# Patient Record
Sex: FEMALE | Race: WHITE | NOT HISPANIC OR LATINO | Employment: OTHER | URBAN - METROPOLITAN AREA
[De-identification: names, ages, dates, MRNs, and addresses within clinical notes are randomized per-mention and may not be internally consistent; named-entity substitution may affect disease eponyms.]

---

## 2017-01-23 ENCOUNTER — TRANSCRIBE ORDERS (OUTPATIENT)
Dept: MAMMOGRAPHY | Facility: HOSPITAL | Age: 57
End: 2017-01-23

## 2017-01-23 DIAGNOSIS — Z12.31 VISIT FOR SCREENING MAMMOGRAM: Primary | ICD-10-CM

## 2017-03-16 ENCOUNTER — HOSPITAL ENCOUNTER (OUTPATIENT)
Dept: MAMMOGRAPHY | Facility: HOSPITAL | Age: 57
Discharge: HOME | End: 2017-03-16
Attending: OBSTETRICS & GYNECOLOGY | Admitting: OBSTETRICS & GYNECOLOGY

## 2017-03-16 DIAGNOSIS — Z12.31 VISIT FOR SCREENING MAMMOGRAM: ICD-10-CM

## 2017-03-16 PROCEDURE — 77063 BREAST TOMOSYNTHESIS BI: CPT

## 2017-03-16 PROCEDURE — 77067 SCR MAMMO BI INCL CAD: CPT | Performed by: RADIOLOGY

## 2017-03-16 PROCEDURE — 77063 BREAST TOMOSYNTHESIS BI: CPT | Performed by: RADIOLOGY

## 2017-03-16 PROCEDURE — G0202 SCR MAMMO BI INCL CAD: HCPCS

## 2018-09-19 ENCOUNTER — TRANSCRIBE ORDERS (OUTPATIENT)
Dept: MAMMOGRAPHY | Facility: HOSPITAL | Age: 58
End: 2018-09-19

## 2018-09-19 DIAGNOSIS — Z12.31 VISIT FOR SCREENING MAMMOGRAM: Primary | ICD-10-CM

## 2018-10-08 ENCOUNTER — HOSPITAL ENCOUNTER (OUTPATIENT)
Dept: MAMMOGRAPHY | Facility: HOSPITAL | Age: 58
Discharge: HOME | End: 2018-10-08
Attending: OBSTETRICS & GYNECOLOGY | Admitting: OBSTETRICS & GYNECOLOGY

## 2018-10-08 ENCOUNTER — APPOINTMENT (OUTPATIENT)
Dept: MAMMOGRAPHY | Facility: HOSPITAL | Age: 58
End: 2018-10-08
Attending: OBSTETRICS & GYNECOLOGY

## 2018-10-08 DIAGNOSIS — Z12.31 VISIT FOR SCREENING MAMMOGRAM: ICD-10-CM

## 2018-10-08 PROCEDURE — 77067 SCR MAMMO BI INCL CAD: CPT | Performed by: RADIOLOGY

## 2018-10-08 PROCEDURE — 77063 BREAST TOMOSYNTHESIS BI: CPT | Performed by: RADIOLOGY

## 2018-10-08 PROCEDURE — 77067 SCR MAMMO BI INCL CAD: CPT

## 2018-10-08 PROCEDURE — 77063 BREAST TOMOSYNTHESIS BI: CPT

## 2018-12-13 ENCOUNTER — TRANSCRIBE ORDERS (OUTPATIENT)
Dept: ADMINISTRATIVE | Facility: HOSPITAL | Age: 58
End: 2018-12-13

## 2018-12-13 DIAGNOSIS — Z78.0 POSTMENOPAUSE: Primary | ICD-10-CM

## 2019-01-23 ENCOUNTER — APPOINTMENT (OUTPATIENT)
Dept: BONE DENSITY | Facility: HOSPITAL | Age: 59
End: 2019-01-23
Attending: INTERNAL MEDICINE

## 2019-02-21 RX ORDER — ZOLPIDEM TARTRATE 5 MG/1
TABLET ORAL
Qty: 30 TABLET | Refills: 1 | Status: SHIPPED | OUTPATIENT
Start: 2019-02-21 | End: 2019-10-07 | Stop reason: SDUPTHER

## 2019-04-04 RX ORDER — DEXLANSOPRAZOLE 60 MG/1
CAPSULE, DELAYED RELEASE ORAL
Qty: 30 CAPSULE | Refills: 5 | Status: SHIPPED | OUTPATIENT
Start: 2019-04-04 | End: 2019-04-05 | Stop reason: SDUPTHER

## 2019-04-05 RX ORDER — DEXLANSOPRAZOLE 60 MG/1
CAPSULE, DELAYED RELEASE ORAL
Qty: 30 CAPSULE | Refills: 5 | Status: SHIPPED | OUTPATIENT
Start: 2019-04-05 | End: 2019-07-15

## 2019-05-01 ENCOUNTER — TELEPHONE (OUTPATIENT)
Dept: INTERNAL MEDICINE | Facility: CLINIC | Age: 59
End: 2019-05-01

## 2019-05-01 RX ORDER — LISINOPRIL 10 MG/1
TABLET ORAL
Qty: 30 TABLET | Refills: 5 | Status: SHIPPED | OUTPATIENT
Start: 2019-05-01 | End: 2019-12-17 | Stop reason: SDUPTHER

## 2019-05-01 RX ORDER — ARIPIPRAZOLE 2 MG/1
2 TABLET ORAL DAILY
Qty: 30 TABLET | Refills: 2 | Status: SHIPPED | OUTPATIENT
Start: 2019-05-01 | End: 2019-07-15

## 2019-05-01 NOTE — TELEPHONE ENCOUNTER
Reason for Call:   PT THINKS SOMETHING NEEDS TO BE ADDED TO HER DEPRESSION MEDICATION SHE STATES HER MOTHER PASSED AWAY ON 3/8/19     Symptoms:   CAN'T HARDLY GET OUT OF BED, HARD BEING WITH OTHER PEOPLE, SHE GUESSES ITS THE PTSD    Other pertinent info:  PT STATES YOU HAVE GIVEN HER SOMETHING BEFORE TO GET THROUGH THINGS LIKE THIS     PT WANTS YOU TO KNOW SHE HAS STARTED COUNSELING

## 2019-05-29 RX ORDER — BUPROPION HYDROCHLORIDE 300 MG/1
TABLET ORAL
Qty: 30 TABLET | Refills: 1 | Status: SHIPPED | OUTPATIENT
Start: 2019-05-29 | End: 2019-08-09 | Stop reason: SDUPTHER

## 2019-05-29 RX ORDER — DULOXETIN HYDROCHLORIDE 60 MG/1
CAPSULE, DELAYED RELEASE ORAL
Qty: 30 CAPSULE | Refills: 1 | Status: SHIPPED | OUTPATIENT
Start: 2019-05-29 | End: 2019-08-09 | Stop reason: SDUPTHER

## 2019-06-12 PROBLEM — E55.9 VITAMIN D DEFICIENCY: Status: ACTIVE | Noted: 2019-06-12

## 2019-06-12 PROBLEM — M47.816 LUMBAR SPONDYLOSIS: Status: ACTIVE | Noted: 2019-06-12

## 2019-06-12 PROBLEM — M79.7 FIBROMYALGIA: Status: ACTIVE | Noted: 2019-06-12

## 2019-06-12 PROBLEM — F32.A DEPRESSION: Status: ACTIVE | Noted: 2019-06-12

## 2019-06-12 PROBLEM — J30.9 ALLERGIC RHINITIS: Status: ACTIVE | Noted: 2019-06-12

## 2019-06-12 PROBLEM — K21.9 GASTROESOPHAGEAL REFLUX DISEASE WITHOUT ESOPHAGITIS: Status: ACTIVE | Noted: 2019-06-12

## 2019-06-12 PROBLEM — E78.5 HYPERLIPIDEMIA: Status: ACTIVE | Noted: 2019-06-12

## 2019-06-12 PROBLEM — I10 BENIGN ESSENTIAL HYPERTENSION: Status: ACTIVE | Noted: 2019-06-12

## 2019-06-12 PROBLEM — G43.909 MIGRAINE: Status: ACTIVE | Noted: 2019-06-12

## 2019-07-09 RX ORDER — TRIAMTERENE AND HYDROCHLOROTHIAZIDE 37.5; 25 MG/1; MG/1
CAPSULE ORAL
Qty: 30 CAPSULE | Refills: 0 | Status: SHIPPED | OUTPATIENT
Start: 2019-07-09 | End: 2019-08-09 | Stop reason: SDUPTHER

## 2019-07-15 ENCOUNTER — OFFICE VISIT (OUTPATIENT)
Dept: INTERNAL MEDICINE | Facility: CLINIC | Age: 59
End: 2019-07-15

## 2019-07-15 VITALS
DIASTOLIC BLOOD PRESSURE: 82 MMHG | WEIGHT: 164 LBS | SYSTOLIC BLOOD PRESSURE: 116 MMHG | BODY MASS INDEX: 27.32 KG/M2 | HEIGHT: 65 IN | HEART RATE: 88 BPM

## 2019-07-15 DIAGNOSIS — E55.9 VITAMIN D DEFICIENCY: ICD-10-CM

## 2019-07-15 DIAGNOSIS — M47.816 LUMBAR SPONDYLOSIS: ICD-10-CM

## 2019-07-15 DIAGNOSIS — G43.009 MIGRAINE WITHOUT AURA AND WITHOUT STATUS MIGRAINOSUS, NOT INTRACTABLE: ICD-10-CM

## 2019-07-15 DIAGNOSIS — M79.7 FIBROMYALGIA: ICD-10-CM

## 2019-07-15 DIAGNOSIS — E78.2 MIXED HYPERLIPIDEMIA: ICD-10-CM

## 2019-07-15 DIAGNOSIS — I10 BENIGN ESSENTIAL HYPERTENSION: Primary | ICD-10-CM

## 2019-07-15 DIAGNOSIS — F33.1 MODERATE EPISODE OF RECURRENT MAJOR DEPRESSIVE DISORDER (HCC): ICD-10-CM

## 2019-07-15 PROCEDURE — 99396 PREV VISIT EST AGE 40-64: CPT | Performed by: INTERNAL MEDICINE

## 2019-07-15 RX ORDER — LEVOTHYROXINE AND LIOTHYRONINE 38; 9 UG/1; UG/1
1 TABLET ORAL DAILY
COMMUNITY
End: 2020-07-31 | Stop reason: SDUPTHER

## 2019-07-15 RX ORDER — ESTRADIOL 10 UG/1
1 INSERT VAGINAL 2 TIMES WEEKLY
COMMUNITY
End: 2020-01-17

## 2019-07-15 RX ORDER — ALMOTRIPTAN 6.25 MG/1
1 TABLET, FILM COATED ORAL AS NEEDED
COMMUNITY
End: 2019-10-07 | Stop reason: SDUPTHER

## 2019-07-15 RX ORDER — NYSTATIN 100000 U/G
1 CREAM TOPICAL DAILY
COMMUNITY
End: 2021-09-16

## 2019-07-15 RX ORDER — ESTRADIOL 0.05 MG/D
1 PATCH, EXTENDED RELEASE TRANSDERMAL 2 TIMES WEEKLY
COMMUNITY
End: 2021-05-24 | Stop reason: SDUPTHER

## 2019-07-15 RX ORDER — BUSPIRONE HYDROCHLORIDE 7.5 MG/1
TABLET ORAL
Start: 2019-07-15 | End: 2019-12-17 | Stop reason: SDUPTHER

## 2019-07-15 RX ORDER — ERGOCALCIFEROL 1.25 MG/1
1 CAPSULE ORAL WEEKLY
COMMUNITY
End: 2019-11-18 | Stop reason: SDUPTHER

## 2019-07-15 RX ORDER — TRIAMCINOLONE ACETONIDE 1 MG/G
1 CREAM TOPICAL DAILY
COMMUNITY
End: 2020-07-31

## 2019-07-15 RX ORDER — LANSOPRAZOLE 30 MG/1
30 CAPSULE, DELAYED RELEASE ORAL DAILY
Qty: 30 CAPSULE | Refills: 5 | Status: SHIPPED | OUTPATIENT
Start: 2019-07-15 | End: 2020-01-17

## 2019-07-15 RX ORDER — MELOXICAM 15 MG/1
1 TABLET ORAL DAILY
COMMUNITY
End: 2019-10-07 | Stop reason: SDUPTHER

## 2019-07-15 NOTE — PROGRESS NOTES
[facility]     Batool Hampton  1960   [id number]      Patient Care Team:  Don Rodriguez MD as PCP - General (Internal Medicine)    Chief Complaint::   Chief Complaint   Patient presents with   • Annual Exam        HPI  Batool is now 59.  She comes in for annual examination and for follow-up of her anxiety and depression, fibromyalgia, hypertension, hyperlipidemia, vitamin D deficiency, migraine and lumbar spondylosis.  She remains under a great deal of stress.  She for the past few years her life was consumed by care of her mother.  Since her mother , her 's 17-year-old son has moved in with him, and she is now basically assumed care of him.  She is very frustrated that she now has another role as caregiver, and her  does not seem to recognize that fact.  She has been in [place] for the past few days, after she felt that she had to leave her current environment.    Chronic Conditions:      Patient Active Problem List   Diagnosis   • Lumbar spondylosis   • Fibromyalgia   • Depression   • Allergic rhinitis   • Hyperlipidemia   • Benign essential hypertension   • Migraine   • Gastroesophageal reflux disease without esophagitis   • Vitamin D deficiency        Past Medical History:   Diagnosis Date   • Acute cholecystitis 2016   • Deviated nasal septum    • HNP (herniated nucleus pulposus), lumbar 2008    L5Si   • MRSA (methicillin resistant staph aureus) culture positive     Great toe L       Past Surgical History:   Procedure Laterality Date   • HYSTERECTOMY     • KNEE SURGERY Bilateral    • LAPAROSCOPIC CHOLECYSTECTOMY     • NASAL SEPTUM SURGERY     • OOPHORECTOMY         Family History   Problem Relation Age of Onset   • BRCA 1/2 Maternal Grandmother 40   • BRCA 1/2 Cousin 23   • Cancer Father         Bladder   • Coronary artery disease Father 61        premature    • Pancreatic cancer Father    • Hypertension Father        Social History      Socioeconomic History   • Marital status:      Spouse name: Not on file   • Number of children: Not on file   • Years of education: Not on file   • Highest education level: Not on file   Tobacco Use   • Smoking status: Never Smoker       Allergies   Allergen Reactions   • Codeine Unknown (See Comments)     Unknown   • Morphine Unknown (See Comments)     Unknown   • Tetracyclines & Related Unknown (See Comments)     Unknown         Current Outpatient Medications:   •  almotriptan (AXERT) 6.25 MG tablet, Take 1 tablet by mouth As Needed. Migraine headache, Disp: , Rfl:   •  buPROPion XL (WELLBUTRIN XL) 300 MG 24 hr tablet, TAKE ONE TABLET ONCE DAILY, Disp: 30 tablet, Rfl: 1  •  diclofenac (VOLTAREN) 1 % gel gel, Apply 1 g topically to the appropriate area as directed Daily., Disp: , Rfl:   •  DULoxetine (CYMBALTA) 60 MG capsule, TAKE ONE CAPSULE ONCE DAILY, Disp: 30 capsule, Rfl: 1  •  estradiol (VIVELLE-DOT) 0.05 MG/24HR patch, Apply 1 patch topically to the appropriate area as directed 2 (Two) Times a Week., Disp: , Rfl:   •  estradiol (YUVAFEM) 10 MCG tablet vaginal tablet, Take 1 tablet by mouth 2 (Two) Times a Week., Disp: , Rfl:   •  lisinopril (PRINIVIL,ZESTRIL) 10 MG tablet, TAKE ONE TABLET ONCE DAILY, Disp: 30 tablet, Rfl: 5  •  Magnesium Citrate (CITROMA PO), Take 1 tablet by mouth Daily., Disp: , Rfl:   •  meloxicam (MOBIC) 15 MG tablet, Take 1 tablet by mouth Daily., Disp: , Rfl:   •  Misc Natural Products (PROGESTERONE EX), Apply 80 mg topically 2 (Two) Times a Day., Disp: , Rfl:   •  nystatin (MYCOSTATIN) 935725 UNIT/GM cream, Apply 1 application topically to the appropriate area as directed Daily., Disp: , Rfl:   •  Thyroid (ARMOUR THYROID) 60 MG PO tablet, Take 1 tablet by mouth Daily., Disp: , Rfl:   •  triamcinolone (KENALOG) 0.1 % cream, Apply 1 application topically to the appropriate area as directed Daily., Disp: , Rfl:   •  triamterene-hydrochlorothiazide (DYAZIDE) 37.5-25 MG per  "capsule, TAKE ONE CAPSULE ONCE DAILY, Disp: 30 capsule, Rfl: 0  •  vitamin D (ERGOCALCIFEROL) 17102 units capsule capsule, Take 1 capsule by mouth 1 (One) Time Per Week., Disp: , Rfl:   •  zolpidem (AMBIEN) 5 MG tablet, TAKE ONE TABLET AT BEDTIME, Disp: 30 tablet, Rfl: 1  •  busPIRone (BUSPAR) 7.5 MG tablet, Take two tablets twice a day, Disp: , Rfl:   •  lansoprazole (PREVACID) 30 MG capsule, Take 1 capsule by mouth Daily., Disp: 30 capsule, Rfl: 5    Immunization History   Administered Date(s) Administered   • Flu Vaccine Quad PF >18YRS 12/07/2017, 12/04/2018   • Tdap 11/25/2014   • Zostavax 12/05/2018        Health Maintenance Due   Topic Date Due   • ANNUAL PHYSICAL  05/21/1963   • ZOSTER VACCINE (2 of 3) 01/30/2019   • HEPATITIS C SCREENING  02/21/2019        Review of Systems   Constitutional: Negative for chills, fatigue and fever.   HENT: Positive for ear pain. Negative for congestion and sinus pressure.    Respiratory: Negative for cough, chest tightness, shortness of breath and wheezing.    Cardiovascular: Negative for chest pain and palpitations.   Gastrointestinal: Negative for abdominal pain, blood in stool and constipation.   Skin: Negative for color change.   Allergic/Immunologic: Positive for environmental allergies.   Neurological: Negative for dizziness, speech difficulty and headache.   Psychiatric/Behavioral: Negative for decreased concentration. The patient is not nervous/anxious.         Vital Signs  Vitals:    07/15/19 1551   BP: 116/82   BP Location: Left arm   Patient Position: Sitting   Cuff Size: Adult   Pulse: 88   Weight: 74.4 kg (164 lb)   Height: 163.8 cm (64.5\")       Physical Exam   Constitutional: She is oriented to person, place, and time. She appears well-developed and well-nourished.   HENT:   Head: Normocephalic and atraumatic.   Cardiovascular: Normal rate, regular rhythm and normal heart sounds.   No murmur heard.  Pulmonary/Chest: Effort normal and breath sounds normal. "   Neurological: She is alert and oriented to person, place, and time.   Psychiatric: She has a normal mood and affect.   Vitals reviewed.     Procedures    ACE III MINI             Assessment/Plan:    Batool was seen today for annual exam.    Diagnoses and all orders for this visit:    Benign essential hypertension    Mixed hyperlipidemia    Migraine without aura and without status migrainosus, not intractable    Vitamin D deficiency    Fibromyalgia    Lumbar spondylosis    Moderate episode of recurrent major depressive disorder (CMS/HCC)    Other orders  -     lansoprazole (PREVACID) 30 MG capsule; Take 1 capsule by mouth Daily.  -     busPIRone (BUSPAR) 7.5 MG tablet; Take two tablets twice a day    Plan    Blood pressure is controlled on Dyazide and lisinopril.    Lipid panel is pending, continue healthy diet    Vitamin D is pending, continue current high-dose vitamin D.    Fibromyalgia is flared due to stress, depression. see below    We discussed treatment of depression, anxiety and management of stress at length.  She will continue current dosage of duloxetine, but will increase buspirone to 15 mg twice a day on a regular basis.  At that point if she is not better will increase to 30 mg twice a day.  She is seeing a therapist, who is been very helpful.  I have suggested that she resume regular physical activity almost every day, and we discussed the importance of meditation using apps on her phone.            Labs  Results for orders placed or performed in visit on 11/07/16   Tissue Exam   Result Value Ref Range    Case Report       Surgical Pathology Report                         Case: [id number]                                  Authorizing Provider:  Les BASSETT MD          Collected:           11/07/2016 11:25 AM          Pathologist:           Elvia Neri MD       Received:            11/07/2016 02:36 PM          Specimen:    Gallbladder                                                                                 Clinical Information       The working history is cholecystitis.       Final Diagnosis       GALLBLADDER:  Chronic cholecystitis with cholelithiasis.  MHB/klb       Gross Description       Received in formalin labeled gallbladder is an 8.0x3.5x3.0 cm intact gallbladder with attached cystic duct. The serosa is gray/purple and smooth with prominent vasculature. The lumen contains an abundant amount of yellow/brown viscid bile and a single yellow cholelith measuring 0.4 cm in greatest dimension. The mucosa is tan/brown and velvety and the wall thickness averages 0.3 cm. No masses are appreciated. Representative sections to include the cystic duct margin, neck, body and fundus are submitted in one cassette.  [facility]       Microscopic Description       Sections show muscular hypertrophy with slight chronic inflammation and Aschoff-Rokitansky sinus formation.  No neoplasm is seen.        Embedded Images          Counseling was given to patient for the following topics: appropriate exercise daily walking, disease prevention and healthy eating habits.    Plan of care reviewed with patient at the conclusion of today's visit. Education was provided regarding diagnosis, management, and any prescribed or recommended OTC medications.Patient verbalizes understanding of and agreement with management plan.         Don Rodriguez MD

## 2019-07-16 ENCOUNTER — LAB (OUTPATIENT)
Dept: LAB | Facility: HOSPITAL | Age: 59
End: 2019-07-16

## 2019-07-16 ENCOUNTER — TELEPHONE (OUTPATIENT)
Dept: INTERNAL MEDICINE | Facility: CLINIC | Age: 59
End: 2019-07-16

## 2019-07-16 DIAGNOSIS — E78.2 MIXED HYPERLIPIDEMIA: ICD-10-CM

## 2019-07-16 DIAGNOSIS — E55.9 VITAMIN D DEFICIENCY: ICD-10-CM

## 2019-07-16 DIAGNOSIS — I10 BENIGN ESSENTIAL HYPERTENSION: ICD-10-CM

## 2019-07-16 LAB
25(OH)D3 SERPL-MCNC: 43.3 NG/ML (ref 30–100)
ALBUMIN SERPL-MCNC: 4.3 G/DL (ref 3.5–5.2)
ALBUMIN UR-MCNC: <1.2 MG/L
ALBUMIN/GLOB SERPL: 1.5 G/DL
ALP SERPL-CCNC: 97 U/L (ref 39–117)
ALT SERPL W P-5'-P-CCNC: 33 U/L (ref 1–33)
ANION GAP SERPL CALCULATED.3IONS-SCNC: 12.5 MMOL/L (ref 5–15)
AST SERPL-CCNC: 30 U/L (ref 1–32)
BASOPHILS # BLD AUTO: 0.1 10*3/MM3 (ref 0–0.2)
BASOPHILS NFR BLD AUTO: 1.5 % (ref 0–1.5)
BILIRUB SERPL-MCNC: 0.4 MG/DL (ref 0.2–1.2)
BUN BLD-MCNC: 22 MG/DL (ref 6–20)
BUN/CREAT SERPL: 20.4 (ref 7–25)
CALCIUM SPEC-SCNC: 9.8 MG/DL (ref 8.6–10.5)
CHLORIDE SERPL-SCNC: 104 MMOL/L (ref 98–107)
CHOLEST SERPL-MCNC: 247 MG/DL (ref 0–200)
CO2 SERPL-SCNC: 25.5 MMOL/L (ref 22–29)
CREAT BLD-MCNC: 1.08 MG/DL (ref 0.57–1)
CREAT UR-MCNC: 158.4 MG/DL
DEPRECATED RDW RBC AUTO: 45.8 FL (ref 37–54)
EOSINOPHIL # BLD AUTO: 0.12 10*3/MM3 (ref 0–0.4)
EOSINOPHIL NFR BLD AUTO: 1.8 % (ref 0.3–6.2)
ERYTHROCYTE [DISTWIDTH] IN BLOOD BY AUTOMATED COUNT: 12.8 % (ref 12.3–15.4)
GFR SERPL CREATININE-BSD FRML MDRD: 52 ML/MIN/1.73
GLOBULIN UR ELPH-MCNC: 2.8 GM/DL
GLUCOSE BLD-MCNC: 90 MG/DL (ref 65–99)
HCT VFR BLD AUTO: 46.3 % (ref 34–46.6)
HDLC SERPL-MCNC: 67 MG/DL (ref 40–60)
HGB BLD-MCNC: 14.5 G/DL (ref 12–15.9)
IMM GRANULOCYTES # BLD AUTO: 0.01 10*3/MM3 (ref 0–0.05)
IMM GRANULOCYTES NFR BLD AUTO: 0.2 % (ref 0–0.5)
LDLC SERPL CALC-MCNC: 164 MG/DL (ref 0–100)
LDLC/HDLC SERPL: 2.45 {RATIO}
LYMPHOCYTES # BLD AUTO: 1.59 10*3/MM3 (ref 0.7–3.1)
LYMPHOCYTES NFR BLD AUTO: 24.1 % (ref 19.6–45.3)
MCH RBC QN AUTO: 30.7 PG (ref 26.6–33)
MCHC RBC AUTO-ENTMCNC: 31.3 G/DL (ref 31.5–35.7)
MCV RBC AUTO: 97.9 FL (ref 79–97)
MICROALBUMIN/CREAT UR: NORMAL MG/G
MONOCYTES # BLD AUTO: 0.63 10*3/MM3 (ref 0.1–0.9)
MONOCYTES NFR BLD AUTO: 9.6 % (ref 5–12)
NEUTROPHILS # BLD AUTO: 4.14 10*3/MM3 (ref 1.7–7)
NEUTROPHILS NFR BLD AUTO: 62.8 % (ref 42.7–76)
NRBC BLD AUTO-RTO: 0 /100 WBC (ref 0–0.2)
PLATELET # BLD AUTO: 289 10*3/MM3 (ref 140–450)
PMV BLD AUTO: 10.5 FL (ref 6–12)
POTASSIUM BLD-SCNC: 4.3 MMOL/L (ref 3.5–5.2)
PROT SERPL-MCNC: 7.1 G/DL (ref 6–8.5)
RBC # BLD AUTO: 4.73 10*6/MM3 (ref 3.77–5.28)
SODIUM BLD-SCNC: 142 MMOL/L (ref 136–145)
TRIGL SERPL-MCNC: 80 MG/DL (ref 0–150)
VLDLC SERPL-MCNC: 16 MG/DL (ref 5–40)
WBC NRBC COR # BLD: 6.59 10*3/MM3 (ref 3.4–10.8)

## 2019-07-16 PROCEDURE — 82570 ASSAY OF URINE CREATININE: CPT

## 2019-07-16 PROCEDURE — 82043 UR ALBUMIN QUANTITATIVE: CPT

## 2019-07-16 PROCEDURE — 80053 COMPREHEN METABOLIC PANEL: CPT

## 2019-07-16 PROCEDURE — 82306 VITAMIN D 25 HYDROXY: CPT

## 2019-07-16 PROCEDURE — 80061 LIPID PANEL: CPT

## 2019-07-16 PROCEDURE — 85025 COMPLETE CBC W/AUTO DIFF WBC: CPT

## 2019-08-09 RX ORDER — BUPROPION HYDROCHLORIDE 300 MG/1
TABLET ORAL
Qty: 30 TABLET | Refills: 5 | Status: SHIPPED | OUTPATIENT
Start: 2019-08-09 | End: 2020-07-31 | Stop reason: SDUPTHER

## 2019-08-09 RX ORDER — DULOXETIN HYDROCHLORIDE 60 MG/1
CAPSULE, DELAYED RELEASE ORAL
Qty: 30 CAPSULE | Refills: 5 | Status: SHIPPED | OUTPATIENT
Start: 2019-08-09 | End: 2020-01-17

## 2019-08-09 RX ORDER — TRIAMTERENE AND HYDROCHLOROTHIAZIDE 37.5; 25 MG/1; MG/1
CAPSULE ORAL
Qty: 30 CAPSULE | Refills: 5 | Status: SHIPPED | OUTPATIENT
Start: 2019-08-09 | End: 2020-03-16

## 2019-08-09 RX ORDER — ARIPIPRAZOLE 2 MG/1
2 TABLET ORAL DAILY
Qty: 30 TABLET | Refills: 2 | OUTPATIENT
Start: 2019-08-09

## 2019-10-07 RX ORDER — ZOLPIDEM TARTRATE 5 MG/1
TABLET ORAL
Qty: 30 TABLET | Refills: 2 | Status: SHIPPED | OUTPATIENT
Start: 2019-10-07 | End: 2020-02-17

## 2019-10-07 RX ORDER — ALMOTRIPTAN 6.25 MG/1
TABLET, FILM COATED ORAL
Qty: 10 TABLET | OUTPATIENT
Start: 2019-10-07

## 2019-10-07 RX ORDER — MELOXICAM 15 MG/1
15 TABLET ORAL DAILY
Qty: 30 TABLET | Refills: 5 | Status: SHIPPED | OUTPATIENT
Start: 2019-10-07 | End: 2020-07-31

## 2019-10-07 RX ORDER — ALMOTRIPTAN 6.25 MG/1
6.25 TABLET, FILM COATED ORAL AS NEEDED
Qty: 10 TABLET | Refills: 5 | Status: SHIPPED | OUTPATIENT
Start: 2019-10-07 | End: 2020-03-16

## 2019-10-07 NOTE — TELEPHONE ENCOUNTER
Pt wants refill on Meloxicam   [facility] has it as historical      This is per [facility]  last active  11/29/17

## 2019-10-07 NOTE — TELEPHONE ENCOUNTER
Last refill:  2/21/19 #30/1 Last office visit: 7/15/19 Next office visit: 1/17/20 Jacob: in process

## 2019-10-18 ENCOUNTER — TRANSCRIBE ORDERS (OUTPATIENT)
Dept: MAMMOGRAPHY | Facility: HOSPITAL | Age: 59
End: 2019-10-18

## 2019-10-18 DIAGNOSIS — Z12.31 VISIT FOR SCREENING MAMMOGRAM: Primary | ICD-10-CM

## 2019-10-24 ENCOUNTER — TELEPHONE (OUTPATIENT)
Dept: INTERNAL MEDICINE | Facility: CLINIC | Age: 59
End: 2019-10-24

## 2019-10-24 NOTE — TELEPHONE ENCOUNTER
PT CALLED AND WOULD LIKE TO KNOW IF DR ISLAS WANTS TO SEE HER ABOUT THE MEMORY ISSUES SHE IS HAVING, WHICH SHE SAID HAS BEEN DISCUSSED PREVIOUSLY, OR IF HE WOULD REFER HER TO A NEUROLOGIST?

## 2019-11-04 ENCOUNTER — HOSPITAL ENCOUNTER (OUTPATIENT)
Dept: MAMMOGRAPHY | Facility: HOSPITAL | Age: 59
Discharge: HOME | End: 2019-11-04
Admitting: OBSTETRICS & GYNECOLOGY

## 2019-11-04 DIAGNOSIS — Z12.31 VISIT FOR SCREENING MAMMOGRAM: ICD-10-CM

## 2019-11-04 PROCEDURE — 77067 SCR MAMMO BI INCL CAD: CPT

## 2019-11-04 PROCEDURE — 77063 BREAST TOMOSYNTHESIS BI: CPT | Performed by: RADIOLOGY

## 2019-11-04 PROCEDURE — 77063 BREAST TOMOSYNTHESIS BI: CPT

## 2019-11-04 PROCEDURE — 77067 SCR MAMMO BI INCL CAD: CPT | Performed by: RADIOLOGY

## 2019-11-19 RX ORDER — ERGOCALCIFEROL 1.25 MG/1
CAPSULE ORAL
Qty: 4 CAPSULE | Refills: 11 | Status: SHIPPED | OUTPATIENT
Start: 2019-11-19 | End: 2020-07-31

## 2019-12-05 ENCOUNTER — TELEPHONE (OUTPATIENT)
Dept: INTERNAL MEDICINE | Facility: CLINIC | Age: 59
End: 2019-12-05

## 2019-12-05 RX ORDER — DEXLANSOPRAZOLE 60 MG/1
60 CAPSULE, DELAYED RELEASE ORAL DAILY
Qty: 30 CAPSULE | Refills: 2 | Status: SHIPPED | OUTPATIENT
Start: 2019-12-05 | End: 2019-12-11 | Stop reason: SDUPTHER

## 2019-12-05 NOTE — TELEPHONE ENCOUNTER
Pt is requesting a prescription for dexilant 60 mg. Pt stated that she has tried nexium, prilosec, and prevacid. None of these have worked for the pt. Currently her reflux is keeping her up at night and she is having issues with her stomach. Pt also stated that she will need a prior authorization for the dexilant.

## 2019-12-10 NOTE — TELEPHONE ENCOUNTER
Checked Cover My Meds and PA was denied due to plan exclusion. Called [phone] and spoke to Ziggy. He said pt has a Specialty Acct and this was previously denied in July. He will try to re-submit with meds tried and failed, Dx:  K21.9 GERD. Ref # for PA - [id number]. Pended for review with determination 24-48 hrs.  Pt ID # [id number]. LM for pt with information above

## 2019-12-11 RX ORDER — DEXLANSOPRAZOLE 60 MG/1
60 CAPSULE, DELAYED RELEASE ORAL DAILY
Qty: 90 CAPSULE | Refills: 1 | Status: SHIPPED | OUTPATIENT
Start: 2019-12-11 | End: 2020-03-10

## 2019-12-11 NOTE — TELEPHONE ENCOUNTER
Rec'd denial of PA for Dexilant. Notified pt. Checked [facility] and it is still over $200 for a 30 day supply. Pt said her  can send rx to [facility] and she can get a 90 day supply for $200. She would like rx mailed to her at  [address] [place] [address] [place]  [address].   She also said she is having a lot of esophagel burning and it often keeps her up at night. She hasn't had a recent EGD

## 2019-12-17 RX ORDER — BUSPIRONE HYDROCHLORIDE 7.5 MG/1
TABLET ORAL
Qty: 120 TABLET | Refills: 3 | Status: SHIPPED | OUTPATIENT
Start: 2019-12-17 | End: 2020-01-17 | Stop reason: SDUPTHER

## 2019-12-17 RX ORDER — LISINOPRIL 10 MG/1
TABLET ORAL
Qty: 30 TABLET | Refills: 5 | Status: SHIPPED | OUTPATIENT
Start: 2019-12-17 | End: 2020-06-03

## 2020-01-17 ENCOUNTER — OFFICE VISIT (OUTPATIENT)
Dept: INTERNAL MEDICINE | Facility: CLINIC | Age: 60
End: 2020-01-17

## 2020-01-17 VITALS
HEIGHT: 64 IN | DIASTOLIC BLOOD PRESSURE: 92 MMHG | HEART RATE: 88 BPM | WEIGHT: 164 LBS | BODY MASS INDEX: 28 KG/M2 | SYSTOLIC BLOOD PRESSURE: 140 MMHG

## 2020-01-17 DIAGNOSIS — K21.9 GASTROESOPHAGEAL REFLUX DISEASE WITHOUT ESOPHAGITIS: ICD-10-CM

## 2020-01-17 DIAGNOSIS — E78.2 MIXED HYPERLIPIDEMIA: ICD-10-CM

## 2020-01-17 DIAGNOSIS — F32.1 CURRENT MODERATE EPISODE OF MAJOR DEPRESSIVE DISORDER WITHOUT PRIOR EPISODE (HCC): ICD-10-CM

## 2020-01-17 DIAGNOSIS — M79.7 FIBROMYALGIA: ICD-10-CM

## 2020-01-17 DIAGNOSIS — M47.816 LUMBAR SPONDYLOSIS: ICD-10-CM

## 2020-01-17 DIAGNOSIS — E55.9 VITAMIN D DEFICIENCY: ICD-10-CM

## 2020-01-17 DIAGNOSIS — Z23 NEEDS FLU SHOT: ICD-10-CM

## 2020-01-17 DIAGNOSIS — I10 BENIGN ESSENTIAL HYPERTENSION: Primary | ICD-10-CM

## 2020-01-17 PROCEDURE — 90471 IMMUNIZATION ADMIN: CPT | Performed by: INTERNAL MEDICINE

## 2020-01-17 PROCEDURE — 90674 CCIIV4 VAC NO PRSV 0.5 ML IM: CPT | Performed by: INTERNAL MEDICINE

## 2020-01-17 PROCEDURE — 99214 OFFICE O/P EST MOD 30 MIN: CPT | Performed by: INTERNAL MEDICINE

## 2020-01-17 RX ORDER — OMEPRAZOLE 40 MG/1
40 CAPSULE, DELAYED RELEASE ORAL DAILY
COMMUNITY
End: 2020-07-31

## 2020-01-17 RX ORDER — DULOXETIN HYDROCHLORIDE 30 MG/1
30 CAPSULE, DELAYED RELEASE ORAL DAILY
Qty: 30 CAPSULE | Refills: 5 | Status: SHIPPED | OUTPATIENT
Start: 2020-01-17 | End: 2020-02-25 | Stop reason: SDUPTHER

## 2020-01-17 RX ORDER — LEVOCETIRIZINE DIHYDROCHLORIDE 5 MG/1
1 TABLET, FILM COATED ORAL DAILY
COMMUNITY

## 2020-01-17 RX ORDER — BUSPIRONE HYDROCHLORIDE 30 MG/1
TABLET ORAL
Qty: 60 TABLET | Refills: 5 | Status: SHIPPED | OUTPATIENT
Start: 2020-01-17 | End: 2020-07-31 | Stop reason: SDUPTHER

## 2020-01-17 NOTE — PROGRESS NOTES
[facility]     Batool Hampton  1960   [id number]      Patient Care Team:  Don Rodriguez MD as PCP - General (Internal Medicine)    Chief Complaint::   Chief Complaint   Patient presents with   • Hyperlipidemia   • Hypertension        HPI  Mrs. Hampton comes in for follow-up of her hypertension, hyperlipidemia, vitamin D deficiency, GE reflux, lumbar spondylosis, fibromyalgia and depression.  She is having a difficult time emotionally.  Her mother passed away last year.  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  Her daughter has been in rehab for substance abuse and trauma, and is now in the [facility].  Her daughter depends on her and actually called her several times during the visit.  She states she is having a difficult time handling the stress.  She is seeing a therapist.  She feels fatigued and is if she were in a fog.  She is also having vivid dreams and nightmares at night.    Chronic Conditions:      Patient Active Problem List   Diagnosis   • Lumbar spondylosis   • Fibromyalgia   • Depression   • Allergic rhinitis   • Hyperlipidemia   • Benign essential hypertension   • Migraine   • Gastroesophageal reflux disease without esophagitis   • Vitamin D deficiency   • Current moderate episode of major depressive disorder without prior episode (CMS/[facility])        Past Medical History:   Diagnosis Date   • Acute cholecystitis 11/2016   • Deviated nasal septum    • HNP (herniated nucleus pulposus), lumbar 2008    L5Si   • MRSA (methicillin resistant staph aureus) culture positive 2008    Great toe L       Past Surgical History:   Procedure Laterality Date   • HYSTERECTOMY     • KNEE SURGERY Bilateral 1988   • LAPAROSCOPIC CHOLECYSTECTOMY     • NASAL SEPTUM SURGERY  2010   • OOPHORECTOMY         Family History   Problem Relation Age of Onset   • BRCA 1/2 Maternal Grandmother 40   • BRCA 1/2 Cousin 23   • Cancer Father          Bladder   • Coronary artery disease Father 61        premature    • Pancreatic cancer Father    • Hypertension Father    • Ovarian cancer Neg Hx        Social History     Socioeconomic History   • Marital status:      Spouse name: Not on file   • Number of children: Not on file   • Years of education: Not on file   • Highest education level: Not on file   Tobacco Use   • Smoking status: Never Smoker       Allergies   Allergen Reactions   • Codeine Unknown (See Comments)     Unknown   • Morphine Unknown (See Comments)     Unknown   • Tetracyclines & Related Unknown (See Comments)     Unknown         Current Outpatient Medications:   •  almotriptan (AXERT) 6.25 MG tablet, Take 1 tablet by mouth As Needed for Migraine. Migraine headache, Disp: 10 tablet, Rfl: 5  •  buPROPion XL (WELLBUTRIN XL) 300 MG 24 hr tablet, TAKE ONE TABLET ONCE DAILY, Disp: 30 tablet, Rfl: 5  •  busPIRone (BUSPAR) 30 MG tablet, Take one tablet twice a day, Disp: 60 tablet, Rfl: 5  •  diclofenac (VOLTAREN) 1 % gel gel, Apply 1 g topically to the appropriate area as directed Daily., Disp: , Rfl:   •  estradiol (VIVELLE-DOT) 0.05 MG/24HR patch, Apply 1 patch topically to the appropriate area as directed 2 (Two) Times a Week., Disp: , Rfl:   •  lisinopril (PRINIVIL,ZESTRIL) 10 MG tablet, TAKE ONE TABLET ONCE DAILY, Disp: 30 tablet, Rfl: 5  •  Magnesium Citrate (CITROMA PO), Take 1 tablet by mouth Daily., Disp: , Rfl:   •  meloxicam (MOBIC) 15 MG tablet, Take 1 tablet by mouth Daily., Disp: 30 tablet, Rfl: 5  •  Misc Natural Products (PROGESTERONE EX), Apply 80 mg topically 2 (Two) Times a Day., Disp: , Rfl:   •  nystatin (MYCOSTATIN) 238277 UNIT/GM cream, Apply 1 application topically to the appropriate area as directed Daily., Disp: , Rfl:   •  omeprazole (priLOSEC) 40 MG capsule, Take 40 mg by mouth Daily., Disp: , Rfl:   •  Thyroid (ARMOUR THYROID) 60 MG PO tablet, Take 1 tablet by mouth Daily., Disp: , Rfl:   •  triamcinolone (KENALOG)  "0.1 % cream, Apply 1 application topically to the appropriate area as directed Daily., Disp: , Rfl:   •  triamterene-hydrochlorothiazide (DYAZIDE) 37.5-25 MG per capsule, TAKE ONE CAPSULE ONCE DAILY, Disp: 30 capsule, Rfl: 5  •  zolpidem (AMBIEN) 5 MG tablet, TAKE ONE TABLET AT BEDTIME, Disp: 30 tablet, Rfl: 2  •  dexlansoprazole (DEXILANT) 60 MG capsule, Take 1 capsule by mouth Daily for 90 days., Disp: 90 capsule, Rfl: 1  •  DULoxetine (CYMBALTA) 30 MG capsule, Take 1 capsule by mouth Daily., Disp: 30 capsule, Rfl: 5  •  levocetirizine (XYZAL) 5 MG tablet, Take 1 tablet by mouth Daily., Disp: , Rfl:   •  vitamin D (ERGOCALCIFEROL) 1.25 MG (68905 UT) capsule capsule, TAKE ONE CAPSULE ONCE WEEKLY, Disp: 4 capsule, Rfl: 11    Review of Systems   Constitutional: Positive for fatigue. Negative for chills and fever.   HENT: Positive for congestion and sinus pressure. Negative for ear pain.    Respiratory: Negative for cough, chest tightness, shortness of breath and wheezing.    Cardiovascular: Negative for chest pain and palpitations.   Gastrointestinal: Positive for constipation. Negative for blood in stool.   Skin: Negative for color change.   Allergic/Immunologic: Negative for environmental allergies.   Neurological: Positive for headache. Negative for dizziness and speech difficulty.   Psychiatric/Behavioral: Negative for decreased concentration. The patient is not nervous/anxious.         Vital Signs  Vitals:    01/17/20 0923   BP: 140/92   BP Location: Right arm   Patient Position: Sitting   Cuff Size: Adult   Pulse: 88   Weight: 74.4 kg (164 lb)   Height: 163.8 cm (64.49\")   PainSc: 0-No pain       Physical Exam   Constitutional: She is oriented to person, place, and time. She appears well-developed and well-nourished.   HENT:   Head: Normocephalic and atraumatic.   Cardiovascular: Normal rate, regular rhythm and normal heart sounds.   No murmur heard.  Pulmonary/Chest: Effort normal and breath sounds normal. "   Neurological: She is alert and oriented to person, place, and time.   Psychiatric: She has a normal mood and affect.   Vitals reviewed.     Procedures    ACE III MINI             Assessment/Plan:    Batool was seen today for hyperlipidemia and hypertension.    Diagnoses and all orders for this visit:    Benign essential hypertension    Needs flu shot  -     Flucelvax Quad=>4Years (PFS)    Mixed hyperlipidemia    Vitamin D deficiency    Gastroesophageal reflux disease without esophagitis    Lumbar spondylosis    Fibromyalgia    Current moderate episode of major depressive disorder without prior episode (CMS/[facility])    Other orders  -     busPIRone (BUSPAR) 30 MG tablet; Take one tablet twice a day  -     DULoxetine (CYMBALTA) 30 MG capsule; Take 1 capsule by mouth Daily.    Plan    Blood pressure is controlled on lisinopril and triamterene-hydrochlorothiazide.    Lipids are well controlled.    Vitamin D remains well controlled, she will continue current dose of vitamin D.    GE reflux is stable on omeprazole.  Fibromyalgia and lumbar spinal stenosis remain reasonably well controlled.    She will continue seeing her therapist, will reduce citalopram as that might be related to her vivid dreams and increase buspirone.          Plan of care reviewed with patient at the conclusion of today's visit. Education was provided regarding diagnosis, management, and any prescribed or recommended OTC medications.Patient verbalizes understanding of and agreement with management plan.         Don Rodriguez MD

## 2020-01-19 PROBLEM — F32.1 CURRENT MODERATE EPISODE OF MAJOR DEPRESSIVE DISORDER WITHOUT PRIOR EPISODE: Status: ACTIVE | Noted: 2020-01-19

## 2020-02-15 DIAGNOSIS — G47.09 OTHER INSOMNIA: Primary | ICD-10-CM

## 2020-02-17 RX ORDER — ZOLPIDEM TARTRATE 5 MG/1
TABLET ORAL
Qty: 30 TABLET | Refills: 1 | Status: SHIPPED | OUTPATIENT
Start: 2020-02-17 | End: 2020-04-15

## 2020-02-25 ENCOUNTER — TELEPHONE (OUTPATIENT)
Dept: INTERNAL MEDICINE | Facility: CLINIC | Age: 60
End: 2020-02-25

## 2020-02-25 RX ORDER — DULOXETIN HYDROCHLORIDE 60 MG/1
60 CAPSULE, DELAYED RELEASE ORAL DAILY
Qty: 30 CAPSULE | Refills: 5 | Status: SHIPPED | OUTPATIENT
Start: 2020-02-25 | End: 2020-07-31 | Stop reason: SDUPTHER

## 2020-02-25 NOTE — TELEPHONE ENCOUNTER
Please tell her I'm very sorry to hear about her .  I have increased cymbalta to 60 mg a day.  She is already on a good dose of bupropion, so with increasing cymbalta to 60, which is an antidepressant, even though it is used for fibromyalgia, can't really add a 3rd antidepressant.  We could increase cymbalta to 90 and then if needed, to 120 mg a day in a few weeks.  Both of these are safe with buspirone.

## 2020-02-25 NOTE — TELEPHONE ENCOUNTER
Pt is requesting a call back to go over recent medicine changes and an update on how she is doing.

## 2020-02-25 NOTE — TELEPHONE ENCOUNTER
Called patient back with Dr. Rodriguez's recommendations.  Patient stated that she understood and verbalized understanding.  Patient also wanted to make a correction when she stated her  has ALS.  She stated that the doctors thinks he has it, but they will be going to the [facility] soon.

## 2020-02-25 NOTE — TELEPHONE ENCOUNTER
Called patient to get more information.  Patient states that the decrease in her Cymbalta to 30mg has caused her fibromyalgia to flare up worse.  The Buspar dosage currently is helping her to sleep much better and she only takes the Ambien as needed.  She states that she wants to go back up on her dosage of Cymbalta to 60mg and also wants a prescription to take for her increased depression.  She states her  has been diagnosed with ALS and she is more depressed and anxious than before.  Please advise.

## 2020-03-16 RX ORDER — ALMOTRIPTAN 6.25 MG/1
TABLET, FILM COATED ORAL
Qty: 10 TABLET | Refills: 5 | Status: SHIPPED | OUTPATIENT
Start: 2020-03-16 | End: 2020-07-31 | Stop reason: SDUPTHER

## 2020-03-16 RX ORDER — TRIAMTERENE AND HYDROCHLOROTHIAZIDE 37.5; 25 MG/1; MG/1
CAPSULE ORAL
Qty: 30 CAPSULE | Refills: 5 | Status: SHIPPED | OUTPATIENT
Start: 2020-03-16 | End: 2021-04-06 | Stop reason: SDUPTHER

## 2020-04-15 DIAGNOSIS — G47.09 OTHER INSOMNIA: ICD-10-CM

## 2020-04-15 RX ORDER — DEXLANSOPRAZOLE 60 MG/1
CAPSULE, DELAYED RELEASE ORAL
Qty: 30 CAPSULE | Refills: 5 | Status: SHIPPED | OUTPATIENT
Start: 2020-04-15 | End: 2020-12-21

## 2020-04-15 RX ORDER — ZOLPIDEM TARTRATE 5 MG/1
TABLET ORAL
Qty: 30 TABLET | Refills: 1 | Status: SHIPPED | OUTPATIENT
Start: 2020-04-15 | End: 2020-07-31 | Stop reason: SDUPTHER

## 2020-04-15 NOTE — TELEPHONE ENCOUNTER
"Last refill ambien: 2/27/20 #30/1  Last office visit: 1/17/20 Next office visit: none Jacob: current  Note Dexilant is not on patient's current medicine list; omeprazole is. Noted on last office visit that patient is \"stable on omeprazole\".   "

## 2020-04-23 ENCOUNTER — TELEPHONE (OUTPATIENT)
Dept: INTERNAL MEDICINE | Facility: CLINIC | Age: 60
End: 2020-04-23

## 2020-04-23 NOTE — TELEPHONE ENCOUNTER
When we last spoke with her in February, buspirone was helping her sleep and she wasn't taking ambien regularly.  We also increased her cymbalta back to 60 mg a day.  I know she is under tremendous stress.  I take it she is now taking ambien regularly without much benefit.  See what else is going on and make sure she hasn't made any other medication changes.

## 2020-04-23 NOTE — TELEPHONE ENCOUNTER
Pt states she is still taking buspirone and cymbalta and she added ambien 5 mg about 2 weeks ago. She said if she has not fallen asleep in an hour she will take another 5 mg ambien. No other med changes. She said she just can't wind down

## 2020-04-23 NOTE — TELEPHONE ENCOUNTER
Suggest she go ahead and start taking 10 mg at bedtime and see if that works better.  If it does, I will change prescription.  If not, will try something else.

## 2020-04-23 NOTE — TELEPHONE ENCOUNTER
Pt called in stated the medication zolpidem (AMBIEN) 5 MG tablet is still not working well for her please call back the pt at [phone]

## 2020-06-01 ENCOUNTER — OFFICE VISIT (OUTPATIENT)
Dept: ORTHOPEDIC SURGERY | Facility: CLINIC | Age: 60
End: 2020-06-01

## 2020-06-01 VITALS — BODY MASS INDEX: 28.03 KG/M2 | HEIGHT: 64 IN | OXYGEN SATURATION: 97 % | HEART RATE: 102 BPM | WEIGHT: 164.2 LBS

## 2020-06-01 DIAGNOSIS — M25.551 RIGHT HIP PAIN: Primary | ICD-10-CM

## 2020-06-01 PROCEDURE — 99203 OFFICE O/P NEW LOW 30 MIN: CPT | Performed by: ORTHOPAEDIC SURGERY

## 2020-06-01 NOTE — PROGRESS NOTES
[facility] Note    Subjective     Chief Complaint   Patient presents with   • Right Hip - Pain        HPI    Batool Hampton is a 60 y.o. female who presents with right hip pain.  Onset: mechanical fall. The issue has been ongoing for 5 month(s). Pain is a 5/10 on the pain scale. Pain is described as dull and stabbing. Associated symptoms include popping, grinding, stiffness and giving way/buckling. The pain is worse with walking, standing, sitting, climbing stairs, sleeping, working and leisure; resting improve the pain. Previous treatments have included: nothing.  Pain is located in the groin region.  She had an injury back in September 2019, when she hit up against a shelving is a [employer].    I have reviewed the following portions of the patient's history:History of Present Illness    Patient Active Problem List   Diagnosis   • Lumbar spondylosis   • Fibromyalgia   • Depression   • Allergic rhinitis   • Hyperlipidemia   • Benign essential hypertension   • Migraine   • Gastroesophageal reflux disease without esophagitis   • Vitamin D deficiency   • Current moderate episode of major depressive disorder without prior episode (CMS/HCC)     Past Medical History:   Diagnosis Date   • Acute cholecystitis 11/2016   • Deviated nasal septum    • HNP (herniated nucleus pulposus), lumbar 2008    L5Si   • MRSA (methicillin resistant staph aureus) culture positive 2008    Great toe L      Past Surgical History:   Procedure Laterality Date   • HYSTERECTOMY     • KNEE SURGERY Bilateral 1988   • LAPAROSCOPIC CHOLECYSTECTOMY     • NASAL SEPTUM SURGERY  2010   • OOPHORECTOMY        Family History   Problem Relation Age of Onset   • BRCA 1/2 Maternal Grandmother 40   • BRCA 1/2 Cousin 23   • Cancer Father         Bladder   • Coronary artery disease Father 61        premature    • Pancreatic cancer Father    • Hypertension Father    • Ovarian cancer Neg Hx      Social History     Socioeconomic History    • Marital status:      Spouse name: Not on file   • Number of children: Not on file   • Years of education: Not on file   • Highest education level: Not on file   Tobacco Use   • Smoking status: Never Smoker      Current Outpatient Medications on File Prior to Visit   Medication Sig Dispense Refill   • almotriptan (AXERT) 6.25 MG tablet TAKE ONE TABLET AT ONSET OF HEADACHE, MAY REPEAT DOSE IN 2 HOURS IF NEEDED (MAX OF 2 IN 24 HRS) 10 tablet 5   • buPROPion XL (WELLBUTRIN XL) 300 MG 24 hr tablet TAKE ONE TABLET ONCE DAILY 30 tablet 5   • busPIRone (BUSPAR) 30 MG tablet Take one tablet twice a day 60 tablet 5   • DEXILANT 60 MG capsule TAKE 1 CAPSULE BY ORAL ROUTE  EVERY DAY 30 capsule 5   • diclofenac (VOLTAREN) 1 % gel gel Apply 1 g topically to the appropriate area as directed Daily.     • DULoxetine (CYMBALTA) 60 MG capsule Take 1 capsule by mouth Daily. 30 capsule 5   • estradiol (VIVELLE-DOT) 0.05 MG/24HR patch Apply 1 patch topically to the appropriate area as directed 2 (Two) Times a Week.     • levocetirizine (XYZAL) 5 MG tablet Take 1 tablet by mouth Daily.     • lisinopril (PRINIVIL,ZESTRIL) 10 MG tablet TAKE ONE TABLET ONCE DAILY 30 tablet 5   • Magnesium Citrate (CITROMA PO) Take 1 tablet by mouth Daily.     • meloxicam (MOBIC) 15 MG tablet Take 1 tablet by mouth Daily. 30 tablet 5   • Misc Natural Products (PROGESTERONE EX) Apply 80 mg topically 2 (Two) Times a Day.     • nystatin (MYCOSTATIN) 498846 UNIT/GM cream Apply 1 application topically to the appropriate area as directed Daily.     • omeprazole (priLOSEC) 40 MG capsule Take 40 mg by mouth Daily.     • Thyroid (ARMOUR THYROID) 60 MG PO tablet Take 1 tablet by mouth Daily.     • triamcinolone (KENALOG) 0.1 % cream Apply 1 application topically to the appropriate area as directed Daily.     • triamterene-hydrochlorothiazide (DYAZIDE) 37.5-25 MG per capsule TAKE ONE CAPSULE ONCE DAILY 30 capsule 5   • vitamin D (ERGOCALCIFEROL) 1.25 MG  (88191 UT) capsule capsule TAKE ONE CAPSULE ONCE WEEKLY 4 capsule 11   • zolpidem (AMBIEN) 5 MG tablet TAKE ONE TABLET AT BEDTIME 30 tablet 1     No current facility-administered medications on file prior to visit.       Allergies   Allergen Reactions   • Codeine Unknown (See Comments)     Unknown   • Morphine Unknown (See Comments)     Unknown   • Tetracyclines & Related Unknown (See Comments)     Unknown        Review of Systems   Constitutional: Positive for activity change. Negative for appetite change, chills, diaphoresis, fatigue, fever and unexpected weight change.   HENT: Negative for congestion, dental problem, drooling, ear discharge, ear pain, facial swelling, hearing loss, mouth sores, nosebleeds, postnasal drip, rhinorrhea, sinus pressure, sneezing, sore throat, tinnitus, trouble swallowing and voice change.    Eyes: Negative for photophobia, pain, discharge, redness, itching and visual disturbance.   Respiratory: Negative for apnea, cough, choking, chest tightness, shortness of breath, wheezing and stridor.    Cardiovascular: Negative for chest pain, palpitations and leg swelling.   Gastrointestinal: Positive for constipation. Negative for abdominal distention, abdominal pain, anal bleeding, blood in stool, diarrhea, nausea, rectal pain and vomiting.   Endocrine: Negative for cold intolerance, heat intolerance, polydipsia, polyphagia and polyuria.   Genitourinary: Negative for decreased urine volume, difficulty urinating, dysuria, enuresis, flank pain, frequency, genital sores, hematuria and urgency.   Musculoskeletal: Positive for back pain, neck pain and neck stiffness. Negative for arthralgias, gait problem and myalgias.   Skin: Negative for color change, pallor, rash and wound.   Allergic/Immunologic: Negative for environmental allergies, food allergies and immunocompromised state.   Neurological: Positive for headaches. Negative for dizziness, tremors, seizures, syncope, facial asymmetry, speech  "difficulty, weakness, light-headedness and numbness.   Hematological: Negative for adenopathy. Does not bruise/bleed easily.   Psychiatric/Behavioral: Positive for sleep disturbance. Negative for agitation, behavioral problems, confusion, decreased concentration, dysphoric mood, hallucinations, self-injury and suicidal ideas. The patient is nervous/anxious. The patient is not hyperactive.         Objective      Physical Exam  Pulse 102   Ht 163.8 cm (64.49\")   Wt 74.5 kg (164 lb 3.2 oz)   SpO2 97%   BMI 27.76 kg/m²     Body mass index is 27.76 kg/m².    General:   Mental Status:  Alert   Appearance: Cooperative, in no acute distress   Build and Nutrition: Well-nourished well-developed female   Orientation: Alert and oriented to person, place and time   Posture: Normal   Gait: Normal    Integument:   Right hip: No skin lesions, no rash, no ecchymosis    Neurologic:   Sensation:    Right foot: Intact to light touch on the dorsal and plantar aspect   Motor:  Right lower extremity: 5/5 quadriceps, hamstrings, ankle dorsiflexors, and ankle plantar flexors    Vascular:   Right lower extremity: 2+ dorsalis pedis pulse, prompt capillary refill    Lower Extremities:   Right Hip:    Tenderness:  Mild lateral tenderness    Swelling: None    Crepitus:  None    Atrophy:  None    Range of motion:  External Rotation: 30°       Internal Rotation: 30°, with pain in the         groin       Flexion:  100°       Extension:  0°   Instability:  None  Deformities:  None  Functional testing: Positive Stinchfield    No leg length discrepancy      Imaging/Studies      Imaging Results (Last 24 Hours)     Procedure Component Value Units Date/Time    XR Hip With or Without Pelvis 2 - 3 View Right [[id number]] Resulted:  06/01/20 1612     Updated:  06/01/20 1613    Narrative:       Right Hip Radiographs  Indication: right hip pain  Views: low AP pelvis and lateral of the right hip    Comparison: no prior studies available for " review    Findings:   Joint space is maintained, with only mild thickening of the neck and the   head neck junction, seen on the lateral view, with no acute bony   abnormalities, good alignment.          Assessment and Plan     Batool was seen today for pain.    Diagnoses and all orders for this visit:    Right hip pain  -     XR Hip With or Without Pelvis 2 - 3 View Right  -     MRI Hip Right Without Contrast; Future        1. Right hip pain        I reviewed my findings with patient today.  She is experiencing right hip pain, with only mild radiographic signs of arthritis.  At this point recommend an MRI for further evaluation.  I will see her back after the MRI for review, but sooner for any problems.  Intra-articular hip injection may be considered after the MRI if appropriate.    Return for After Imaging Study.    Medical Decision Making  Data/Risk: radiology tests and independent visualization of imaging, lab tests, or EMG/NCV      Ned Vasquez MD  06/01/20  16:14    Dragon disclaimer:  Much of this encounter note is an electronic transcription/translation of spoken language to printed text. The electronic translation of spoken language may permit erroneous, or at times, nonsensical words or phrases to be inadvertently transcribed; Although I have reviewed the note for such errors, some may still exist.

## 2020-06-03 RX ORDER — LISINOPRIL 10 MG/1
TABLET ORAL
Qty: 30 TABLET | Refills: 0 | Status: SHIPPED | OUTPATIENT
Start: 2020-06-03 | End: 2020-07-31 | Stop reason: SDUPTHER

## 2020-06-15 ENCOUNTER — APPOINTMENT (OUTPATIENT)
Dept: MRI IMAGING | Facility: HOSPITAL | Age: 60
End: 2020-06-15

## 2020-06-26 RX ORDER — ACYCLOVIR 50 MG/G
OINTMENT TOPICAL
Qty: 15 G | Refills: 0 | Status: SHIPPED | OUTPATIENT
Start: 2020-06-26 | End: 2021-06-04 | Stop reason: SDUPTHER

## 2020-06-26 RX ORDER — VALACYCLOVIR HYDROCHLORIDE 500 MG/1
500 TABLET, FILM COATED ORAL 2 TIMES DAILY
Qty: 10 TABLET | Refills: 0 | Status: SHIPPED | OUTPATIENT
Start: 2020-06-26 | End: 2021-04-06 | Stop reason: SDUPTHER

## 2020-07-31 ENCOUNTER — OFFICE VISIT (OUTPATIENT)
Dept: INTERNAL MEDICINE | Facility: CLINIC | Age: 60
End: 2020-07-31

## 2020-07-31 VITALS
TEMPERATURE: 98 F | WEIGHT: 165.2 LBS | SYSTOLIC BLOOD PRESSURE: 150 MMHG | HEIGHT: 64 IN | HEART RATE: 76 BPM | DIASTOLIC BLOOD PRESSURE: 100 MMHG | BODY MASS INDEX: 28.2 KG/M2

## 2020-07-31 DIAGNOSIS — E78.2 MIXED HYPERLIPIDEMIA: ICD-10-CM

## 2020-07-31 DIAGNOSIS — M79.7 FIBROMYALGIA: ICD-10-CM

## 2020-07-31 DIAGNOSIS — F32.1 CURRENT MODERATE EPISODE OF MAJOR DEPRESSIVE DISORDER WITHOUT PRIOR EPISODE (HCC): ICD-10-CM

## 2020-07-31 DIAGNOSIS — G47.09 OTHER INSOMNIA: ICD-10-CM

## 2020-07-31 DIAGNOSIS — I10 BENIGN ESSENTIAL HYPERTENSION: Primary | ICD-10-CM

## 2020-07-31 DIAGNOSIS — E55.9 VITAMIN D DEFICIENCY: ICD-10-CM

## 2020-07-31 DIAGNOSIS — K21.9 GASTROESOPHAGEAL REFLUX DISEASE WITHOUT ESOPHAGITIS: ICD-10-CM

## 2020-07-31 DIAGNOSIS — G43.009 MIGRAINE WITHOUT AURA AND WITHOUT STATUS MIGRAINOSUS, NOT INTRACTABLE: ICD-10-CM

## 2020-07-31 PROCEDURE — 99214 OFFICE O/P EST MOD 30 MIN: CPT | Performed by: INTERNAL MEDICINE

## 2020-07-31 RX ORDER — BUSPIRONE HYDROCHLORIDE 15 MG/1
TABLET ORAL
Qty: 60 TABLET | Refills: 5 | Status: SHIPPED | OUTPATIENT
Start: 2020-07-31 | End: 2023-01-13

## 2020-07-31 RX ORDER — ZOLPIDEM TARTRATE 5 MG/1
TABLET ORAL
Qty: 60 TABLET | Refills: 2 | Status: SHIPPED | OUTPATIENT
Start: 2020-07-31 | End: 2021-02-05

## 2020-07-31 RX ORDER — BUPROPION HYDROCHLORIDE 300 MG/1
300 TABLET ORAL DAILY
Qty: 30 TABLET | Refills: 5 | Status: SHIPPED | OUTPATIENT
Start: 2020-07-31 | End: 2021-04-06 | Stop reason: SDUPTHER

## 2020-07-31 RX ORDER — DULOXETIN HYDROCHLORIDE 60 MG/1
60 CAPSULE, DELAYED RELEASE ORAL DAILY
Qty: 30 CAPSULE | Refills: 5 | Status: SHIPPED | OUTPATIENT
Start: 2020-07-31 | End: 2021-04-06 | Stop reason: SDUPTHER

## 2020-07-31 RX ORDER — LEVOTHYROXINE AND LIOTHYRONINE 38; 9 UG/1; UG/1
60 TABLET ORAL DAILY
Qty: 60 TABLET | Refills: 5 | Status: SHIPPED | OUTPATIENT
Start: 2020-07-31 | End: 2021-09-16

## 2020-07-31 RX ORDER — ALMOTRIPTAN 6.25 MG/1
6.25 TABLET, FILM COATED ORAL ONCE AS NEEDED
Qty: 10 TABLET | Refills: 5 | Status: SHIPPED | OUTPATIENT
Start: 2020-07-31 | End: 2020-11-13

## 2020-07-31 RX ORDER — LISINOPRIL 10 MG/1
10 TABLET ORAL DAILY
Qty: 30 TABLET | Refills: 5 | Status: SHIPPED | OUTPATIENT
Start: 2020-07-31 | End: 2021-06-29

## 2020-07-31 NOTE — PROGRESS NOTES
[facility]     Batool Hampton  1960   [id number]      Patient Care Team:  Don Rodriguez MD as PCP - General (Internal Medicine)    Chief Complaint::   Chief Complaint   Patient presents with   • Hypertension        HPI  Mrs. Hampton comes in for follow-up of her hypertension, hyperlipidemia, vitamin D deficiency, migraine, GERD, fibromyalgia and depression.  She still is not sleeping well, she takes 5 mg of Ambien at bedtime, and if she is not sleeping within 30 minutes she takes an extra half and then sometimes takes the additional half later in the night if needed.  She is experiencing more headaches but notes that she seems to have come to terms with the stress she is under and is managing better emotionally.  Her fibromyalgia has also improved with persistent neck and shoulder pain but less total body pain.  She is still dealing with her daughters addiction and her 's neurologic problem.  She states that in April and May she reached a low emotional point but her therapist has helped her to manage and she is clearly emotionally better than she was when I last saw her in January.    Chronic Conditions:      Patient Active Problem List   Diagnosis   • Lumbar spondylosis   • Fibromyalgia   • Depression   • Allergic rhinitis   • Hyperlipidemia   • Benign essential hypertension   • Migraine   • Gastroesophageal reflux disease without esophagitis   • Vitamin D deficiency   • Current moderate episode of major depressive disorder without prior episode (CMS/[facility])        Past Medical History:   Diagnosis Date   • Acute cholecystitis 11/2016   • Deviated nasal septum    • HNP (herniated nucleus pulposus), lumbar 2008    L5Si   • MRSA (methicillin resistant staph aureus) culture positive 2008    Great toe L       Past Surgical History:   Procedure Laterality Date   • HYSTERECTOMY     • KNEE SURGERY Bilateral 1988   • LAPAROSCOPIC CHOLECYSTECTOMY     • NASAL SEPTUM SURGERY  2010   •  OOPHORECTOMY         Family History   Problem Relation Age of Onset   • BRCA 1/2 Maternal Grandmother 40   • BRCA 1/2 Cousin 23   • Cancer Father         Bladder   • Coronary artery disease Father 61        premature    • Pancreatic cancer Father    • Hypertension Father    • Ovarian cancer Neg Hx        Social History     Socioeconomic History   • Marital status:      Spouse name: Not on file   • Number of children: Not on file   • Years of education: Not on file   • Highest education level: Not on file   Tobacco Use   • Smoking status: Never Smoker   • Smokeless tobacco: Never Used   Substance and Sexual Activity   • Alcohol use: Yes     Alcohol/week: 1.0 - 2.0 standard drinks     Types: 1 - 2 Glasses of wine per week     Comment: 4 days /week       Allergies   Allergen Reactions   • Codeine Unknown (See Comments)     Unknown   • Morphine Unknown (See Comments)     Unknown   • Tetracyclines & Related Unknown (See Comments)     Unknown         Current Outpatient Medications:   •  acyclovir (Zovirax) 5 % ointment, Apply  topically to the appropriate area as directed Every 3 (Three) Hours., Disp: 15 g, Rfl: 0  •  almotriptan (AXERT) 6.25 MG tablet, Take 1 tablet by mouth 1 (One) Time As Needed for Migraine for up to 1 dose. may repeat in 2 hours if needed, Disp: 10 tablet, Rfl: 5  •  buPROPion XL (WELLBUTRIN XL) 300 MG 24 hr tablet, Take 1 tablet by mouth Daily., Disp: 30 tablet, Rfl: 5  •  busPIRone (BUSPAR) 15 MG tablet, Take one tablet twice a day, Disp: 60 tablet, Rfl: 5  •  DEXILANT 60 MG capsule, TAKE 1 CAPSULE BY ORAL ROUTE  EVERY DAY, Disp: 30 capsule, Rfl: 5  •  diclofenac (VOLTAREN) 1 % gel gel, Apply 1 g topically to the appropriate area as directed Daily., Disp: , Rfl:   •  DULoxetine (CYMBALTA) 60 MG capsule, Take 1 capsule by mouth Daily., Disp: 30 capsule, Rfl: 5  •  estradiol (VIVELLE-DOT) 0.05 MG/24HR patch, Apply 1 patch topically to the appropriate area as directed 2 (Two) Times a Week.,  "Disp: , Rfl:   •  levocetirizine (XYZAL) 5 MG tablet, Take 1 tablet by mouth Daily., Disp: , Rfl:   •  lisinopril (PRINIVIL,ZESTRIL) 10 MG tablet, Take 1 tablet by mouth Daily., Disp: 30 tablet, Rfl: 5  •  Misc Natural Products (PROGESTERONE EX), Apply 80 mg topically 2 (Two) Times a Day., Disp: , Rfl:   •  nystatin (MYCOSTATIN) 434722 UNIT/GM cream, Apply 1 application topically to the appropriate area as directed Daily., Disp: , Rfl:   •  Thyroid (ARMOUR THYROID) 60 MG PO tablet, Take 1 tablet by mouth Daily., Disp: 60 tablet, Rfl: 5  •  triamterene-hydrochlorothiazide (DYAZIDE) 37.5-25 MG per capsule, TAKE ONE CAPSULE ONCE DAILY, Disp: 30 capsule, Rfl: 5  •  valACYclovir (Valtrex) 500 MG tablet, Take 1 tablet by mouth 2 (Two) Times a Day., Disp: 10 tablet, Rfl: 0  •  zolpidem (AMBIEN) 5 MG tablet, Take 1-2 tablets at bedtime as needed for sleep., Disp: 60 tablet, Rfl: 2    Review of Systems   Constitutional: Negative for chills, fatigue and fever.   HENT: Negative for congestion, ear pain and sinus pressure.    Respiratory: Negative for cough, chest tightness, shortness of breath and wheezing.    Cardiovascular: Negative for chest pain and palpitations.   Gastrointestinal: Negative for abdominal pain, blood in stool and constipation.   Skin: Negative for color change.   Allergic/Immunologic: Negative for environmental allergies.   Neurological: Negative for dizziness, speech difficulty and headache.   Psychiatric/Behavioral: Negative for decreased concentration. The patient is not nervous/anxious.         Vital Signs  Vitals:    07/31/20 0817   BP: 150/100   BP Location: Left arm   Patient Position: Sitting   Cuff Size: Adult   Pulse: 76   Temp: 98 °F (36.7 °C)   TempSrc: Temporal   Weight: 74.9 kg (165 lb 3.2 oz)   Height: 163.8 cm (64.49\")   PainSc:   5   PainLoc: Hand       Physical Exam   Constitutional: She is oriented to person, place, and time. She appears well-developed and well-nourished.   HENT:   Head: " Normocephalic and atraumatic.   Cardiovascular: Normal rate, regular rhythm and normal heart sounds.   No murmur heard.  Pulmonary/Chest: Effort normal and breath sounds normal.   Neurological: She is alert and oriented to person, place, and time.   Psychiatric: She has a normal mood and affect.   Vitals reviewed.     Procedures    ACE III MINI             Assessment/Plan:    Batool was seen today for hypertension.    Diagnoses and all orders for this visit:    Benign essential hypertension  -     CBC & Differential; Future  -     Comprehensive Metabolic Panel; Future    Other insomnia  -     zolpidem (AMBIEN) 5 MG tablet; Take 1-2 tablets at bedtime as needed for sleep.    Mixed hyperlipidemia  -     Lipid Panel; Future    Vitamin D deficiency  -     Vitamin D 25 Hydroxy; Future    Migraine without aura and without status migrainosus, not intractable    Gastroesophageal reflux disease without esophagitis    Fibromyalgia    Current moderate episode of major depressive disorder without prior episode (CMS/HCC)    Other orders  -     buPROPion XL (WELLBUTRIN XL) 300 MG 24 hr tablet; Take 1 tablet by mouth Daily.  -     busPIRone (BUSPAR) 15 MG tablet; Take one tablet twice a day  -     DULoxetine (CYMBALTA) 60 MG capsule; Take 1 capsule by mouth Daily.  -     lisinopril (PRINIVIL,ZESTRIL) 10 MG tablet; Take 1 tablet by mouth Daily.  -     Thyroid (ARMOUR THYROID) 60 MG PO tablet; Take 1 tablet by mouth Daily.  -     almotriptan (AXERT) 6.25 MG tablet; Take 1 tablet by mouth 1 (One) Time As Needed for Migraine for up to 1 dose. may repeat in 2 hours if needed    Plan    Blood pressure is up today but she admits that she has not taken her medication yet.  She will continue lisinopril 10 mg a day, triamterene-hydrochlorothiazide and monitor her blood pressure at home.    Lipid panel is pending, continue healthy diet.    Vitamin D level is pending, she completed course of high-dose vitamin D earlier.    Migraine is  flared, possibly due to stress or sleep disturbance.  She will continue Axert as needed and will continue to work on sleep hygiene.    She continues to have a good response from Dexilant for her GERD.    Fibromyalgia is currently reasonably well controlled.    Depression is clearly better on buspirone, which she now takes only as needed, bupropion and duloxetine.  I think her counselors also helped her significantly.    Plan of care reviewed with patient at the conclusion of today's visit. Education was provided regarding diagnosis, management, and any prescribed or recommended OTC medications.Patient verbalizes understanding of and agreement with management plan.         oDn Rodriguez MD

## 2020-08-07 ENCOUNTER — PRIOR AUTHORIZATION (OUTPATIENT)
Dept: INTERNAL MEDICINE | Facility: CLINIC | Age: 60
End: 2020-08-07

## 2020-08-07 NOTE — TELEPHONE ENCOUNTER
Received PA for ambien. I have sent portal to patient to see if she has picked up. Pending response.

## 2020-10-14 ENCOUNTER — TELEPHONE (OUTPATIENT)
Dept: INTERNAL MEDICINE | Facility: CLINIC | Age: 60
End: 2020-10-14

## 2020-11-13 RX ORDER — ALMOTRIPTAN 6.25 MG/1
TABLET, FILM COATED ORAL
Qty: 6 TABLET | Refills: 5 | Status: SHIPPED | OUTPATIENT
Start: 2020-11-13 | End: 2021-08-25

## 2020-12-13 ENCOUNTER — APPOINTMENT (OUTPATIENT)
Dept: PREADMISSION TESTING | Facility: HOSPITAL | Age: 60
End: 2020-12-13

## 2020-12-21 RX ORDER — DEXLANSOPRAZOLE 60 MG/1
CAPSULE, DELAYED RELEASE ORAL
Qty: 30 CAPSULE | Refills: 5 | Status: SHIPPED | OUTPATIENT
Start: 2020-12-21 | End: 2021-04-06 | Stop reason: SDUPTHER

## 2020-12-29 ENCOUNTER — TELEPHONE (OUTPATIENT)
Dept: INTERNAL MEDICINE | Facility: CLINIC | Age: 60
End: 2020-12-29

## 2020-12-29 NOTE — TELEPHONE ENCOUNTER
Antibiotics do not help, some people take vitamin D and zinc, but there is very little evidence that it helps.  If her  tests positive, I will try to get him treated with monoclonal antibodies at the hospital.

## 2020-12-29 NOTE — TELEPHONE ENCOUNTER
PATIENT STATES THAT HER SON WAS VISITING AND HAS NOW BEEN DIAGNOSED WITH COVID. HER  HAS ALS AND THEY ARE WORRIED ABOUT BEING POTENTIALLY EXPOSED. THEY ARE GOING TO GET TESTED FOR COVID IN [place]. THEY JUST WANTED TO MAKE THEIR PCP AWARE AND SEE IF THEY COULD POTENTIALLY START AN ANTIBIOTIC OR VITAMINS TO HELP REDUCE RISK. PLEASE CONTACT WHEN POSSIBLE.     CONTACT: [phone]

## 2021-02-05 DIAGNOSIS — G47.09 OTHER INSOMNIA: ICD-10-CM

## 2021-02-05 RX ORDER — ZOLPIDEM TARTRATE 5 MG/1
TABLET ORAL
Qty: 60 TABLET | Refills: 2 | Status: SHIPPED | OUTPATIENT
Start: 2021-02-05 | End: 2021-05-24

## 2021-02-25 ENCOUNTER — LAB (OUTPATIENT)
Dept: LAB | Facility: HOSPITAL | Age: 61
End: 2021-02-25

## 2021-02-25 ENCOUNTER — OFFICE VISIT (OUTPATIENT)
Dept: INTERNAL MEDICINE | Facility: CLINIC | Age: 61
End: 2021-02-25

## 2021-02-25 VITALS
BODY MASS INDEX: 28.85 KG/M2 | WEIGHT: 169 LBS | SYSTOLIC BLOOD PRESSURE: 118 MMHG | DIASTOLIC BLOOD PRESSURE: 82 MMHG | HEIGHT: 64 IN | HEART RATE: 104 BPM | TEMPERATURE: 97.7 F

## 2021-02-25 DIAGNOSIS — E78.2 MIXED HYPERLIPIDEMIA: ICD-10-CM

## 2021-02-25 DIAGNOSIS — Z00.00 PREVENTATIVE HEALTH CARE: Primary | ICD-10-CM

## 2021-02-25 DIAGNOSIS — I10 BENIGN ESSENTIAL HYPERTENSION: ICD-10-CM

## 2021-02-25 DIAGNOSIS — G43.009 MIGRAINE WITHOUT AURA AND WITHOUT STATUS MIGRAINOSUS, NOT INTRACTABLE: ICD-10-CM

## 2021-02-25 DIAGNOSIS — E55.9 VITAMIN D DEFICIENCY: ICD-10-CM

## 2021-02-25 DIAGNOSIS — M79.7 FIBROMYALGIA: ICD-10-CM

## 2021-02-25 DIAGNOSIS — F32.1 CURRENT MODERATE EPISODE OF MAJOR DEPRESSIVE DISORDER WITHOUT PRIOR EPISODE (HCC): ICD-10-CM

## 2021-02-25 DIAGNOSIS — K21.9 GASTROESOPHAGEAL REFLUX DISEASE WITHOUT ESOPHAGITIS: ICD-10-CM

## 2021-02-25 DIAGNOSIS — M47.816 LUMBAR SPONDYLOSIS: ICD-10-CM

## 2021-02-25 LAB
25(OH)D3 SERPL-MCNC: 20.3 NG/ML
ALBUMIN SERPL-MCNC: 4.3 G/DL (ref 3.5–5.2)
ALBUMIN/GLOB SERPL: 1.7 G/DL
ALP SERPL-CCNC: 72 U/L (ref 39–117)
ALT SERPL W P-5'-P-CCNC: 19 U/L (ref 1–33)
ANION GAP SERPL CALCULATED.3IONS-SCNC: 9.9 MMOL/L (ref 5–15)
AST SERPL-CCNC: 22 U/L (ref 1–32)
BASOPHILS # BLD AUTO: 0.06 10*3/MM3 (ref 0–0.2)
BASOPHILS NFR BLD AUTO: 1 % (ref 0–1.5)
BILIRUB SERPL-MCNC: 0.5 MG/DL (ref 0–1.2)
BUN SERPL-MCNC: 15 MG/DL (ref 6–20)
BUN/CREAT SERPL: 13.8 (ref 7–25)
CALCIUM SPEC-SCNC: 9.4 MG/DL (ref 8.6–10.5)
CHLORIDE SERPL-SCNC: 104 MMOL/L (ref 98–107)
CHOLEST SERPL-MCNC: 265 MG/DL (ref 0–200)
CO2 SERPL-SCNC: 26.1 MMOL/L (ref 22–29)
CREAT SERPL-MCNC: 1.09 MG/DL (ref 0.57–1)
DEPRECATED RDW RBC AUTO: 41.9 FL (ref 37–54)
EOSINOPHIL # BLD AUTO: 0.15 10*3/MM3 (ref 0–0.4)
EOSINOPHIL NFR BLD AUTO: 2.6 % (ref 0.3–6.2)
ERYTHROCYTE [DISTWIDTH] IN BLOOD BY AUTOMATED COUNT: 11.9 % (ref 12.3–15.4)
GFR SERPL CREATININE-BSD FRML MDRD: 51 ML/MIN/1.73
GLOBULIN UR ELPH-MCNC: 2.6 GM/DL
GLUCOSE SERPL-MCNC: 86 MG/DL (ref 65–99)
HCT VFR BLD AUTO: 44.5 % (ref 34–46.6)
HDLC SERPL-MCNC: 61 MG/DL (ref 40–60)
HGB BLD-MCNC: 15 G/DL (ref 12–15.9)
IMM GRANULOCYTES # BLD AUTO: 0.01 10*3/MM3 (ref 0–0.05)
IMM GRANULOCYTES NFR BLD AUTO: 0.2 % (ref 0–0.5)
LDLC SERPL CALC-MCNC: 179 MG/DL (ref 0–100)
LDLC/HDLC SERPL: 2.88 {RATIO}
LYMPHOCYTES # BLD AUTO: 1.63 10*3/MM3 (ref 0.7–3.1)
LYMPHOCYTES NFR BLD AUTO: 28.1 % (ref 19.6–45.3)
MCH RBC QN AUTO: 31.7 PG (ref 26.6–33)
MCHC RBC AUTO-ENTMCNC: 33.7 G/DL (ref 31.5–35.7)
MCV RBC AUTO: 94.1 FL (ref 79–97)
MONOCYTES # BLD AUTO: 0.45 10*3/MM3 (ref 0.1–0.9)
MONOCYTES NFR BLD AUTO: 7.8 % (ref 5–12)
NEUTROPHILS NFR BLD AUTO: 3.5 10*3/MM3 (ref 1.7–7)
NEUTROPHILS NFR BLD AUTO: 60.3 % (ref 42.7–76)
NRBC BLD AUTO-RTO: 0 /100 WBC (ref 0–0.2)
PLATELET # BLD AUTO: 258 10*3/MM3 (ref 140–450)
PMV BLD AUTO: 10.3 FL (ref 6–12)
POTASSIUM SERPL-SCNC: 4.7 MMOL/L (ref 3.5–5.2)
PROT SERPL-MCNC: 6.9 G/DL (ref 6–8.5)
RBC # BLD AUTO: 4.73 10*6/MM3 (ref 3.77–5.28)
SODIUM SERPL-SCNC: 140 MMOL/L (ref 136–145)
TRIGL SERPL-MCNC: 141 MG/DL (ref 0–150)
VLDLC SERPL-MCNC: 25 MG/DL (ref 5–40)
WBC # BLD AUTO: 5.8 10*3/MM3 (ref 3.4–10.8)

## 2021-02-25 PROCEDURE — 80053 COMPREHEN METABOLIC PANEL: CPT

## 2021-02-25 PROCEDURE — 99396 PREV VISIT EST AGE 40-64: CPT | Performed by: INTERNAL MEDICINE

## 2021-02-25 PROCEDURE — 82306 VITAMIN D 25 HYDROXY: CPT

## 2021-02-25 PROCEDURE — 80061 LIPID PANEL: CPT

## 2021-02-25 PROCEDURE — 85025 COMPLETE CBC W/AUTO DIFF WBC: CPT

## 2021-02-25 RX ORDER — LAMOTRIGINE 100 MG/1
100 TABLET ORAL DAILY
COMMUNITY
Start: 2021-01-16 | End: 2023-03-01 | Stop reason: SDUPTHER

## 2021-02-25 RX ORDER — BUPROPION HYDROCHLORIDE 150 MG/1
1 TABLET ORAL DAILY
COMMUNITY
Start: 2021-02-13 | End: 2021-09-16 | Stop reason: SDUPTHER

## 2021-02-25 NOTE — PROGRESS NOTES
[facility]     Batool Hampton  1960   [id number]      Patient Care Team:  Don Rodriguez MD as PCP - General (Internal Medicine)    Chief Complaint::   Chief Complaint   Patient presents with   • Annual Exam        HPI  Mrs. Hampton is now 60.  She comes in for follow-up of her hypertension, hyperlipidemia, vitamin D deficiency, GERD, depression, fibromyalgia, lumbar spondylosis, migraine and for annual examination.  She remains under considerable stress.  Her , Dave, was finally diagnosed definitively with ALS at [facility].  She still has considerable family stress.  She is seeing a therapist, SAMANTHA Hamilton, who increased her bupropion to 450 and added Lamictal for anxiety.  This has helped.  She continues to experience back and fibromyalgia pain but this is manageable with current therapy.  She admits she is not currently exercising much.  Her migraines are infrequent and respond to almotriptan.  There is no fever, cough, shortness of breath or chest pain.    Chronic Conditions:      Patient Active Problem List   Diagnosis   • Lumbar spondylosis   • Fibromyalgia   • Depression   • Allergic rhinitis   • Hyperlipidemia   • Benign essential hypertension   • Migraine   • Gastroesophageal reflux disease without esophagitis   • Vitamin D deficiency   • Current moderate episode of major depressive disorder without prior episode (CMS/HCC)        Past Medical History:   Diagnosis Date   • Acute cholecystitis 11/2016   • Deviated nasal septum    • HNP (herniated nucleus pulposus), lumbar 2008    L5Si   • MRSA (methicillin resistant staph aureus) culture positive 2008    Great toe L       Past Surgical History:   Procedure Laterality Date   • HYSTERECTOMY     • KNEE SURGERY Bilateral 1988   • LAPAROSCOPIC CHOLECYSTECTOMY     • NASAL SEPTUM SURGERY  2010   • OOPHORECTOMY         Family History   Problem Relation Age of Onset   • BRCA 1/2 Maternal Grandmother 40   • BRCA  1/2 Cousin 23   • Cancer Father         Bladder   • Coronary artery disease Father 61        premature    • Pancreatic cancer Father    • Hypertension Father    • Ovarian cancer Neg Hx        Social History     Socioeconomic History   • Marital status:      Spouse name: Not on file   • Number of children: Not on file   • Years of education: Not on file   • Highest education level: Not on file   Tobacco Use   • Smoking status: Never Smoker   • Smokeless tobacco: Never Used   Substance and Sexual Activity   • Alcohol use: Yes     Alcohol/week: 1.0 - 2.0 standard drinks     Types: 1 - 2 Glasses of wine per week     Comment: 4 days /week       Allergies   Allergen Reactions   • Codeine Unknown (See Comments)     Unknown   • Morphine Unknown (See Comments)     Unknown   • Tetracyclines & Related Unknown (See Comments)     Unknown         Current Outpatient Medications:   •  acyclovir (Zovirax) 5 % ointment, Apply  topically to the appropriate area as directed Every 3 (Three) Hours., Disp: 15 g, Rfl: 0  •  almotriptan (AXERT) 6.25 MG tablet, TAKE ONE TABLET AT ONSET OF HEADACHE, MAY REPEAT DOSE IN 2 HOURS IF NEEDED (MAX OF 2 IN 24 HRS), Disp: 6 tablet, Rfl: 5  •  buPROPion XL (WELLBUTRIN XL) 150 MG 24 hr tablet, Take 1 tablet by mouth Daily. Along with 300 mg, Disp: , Rfl:   •  buPROPion XL (WELLBUTRIN XL) 300 MG 24 hr tablet, Take 1 tablet by mouth Daily., Disp: 30 tablet, Rfl: 5  •  busPIRone (BUSPAR) 15 MG tablet, Take one tablet twice a day (Patient taking differently: Take 15 mg by mouth 2 (Two) Times a Day As Needed.), Disp: 60 tablet, Rfl: 5  •  Dexilant 60 MG capsule, TAKE 1 CAPSULE BY ORAL ROUTE EVERY DAY, Disp: 30 capsule, Rfl: 5  •  diclofenac (VOLTAREN) 1 % gel gel, Apply 1 g topically to the appropriate area as directed Daily., Disp: , Rfl:   •  DULoxetine (CYMBALTA) 60 MG capsule, Take 1 capsule by mouth Daily., Disp: 30 capsule, Rfl: 5  •  estradiol (VIVELLE-DOT) 0.05 MG/24HR patch, Apply 1 patch  topically to the appropriate area as directed 2 (Two) Times a Week., Disp: , Rfl:   •  lamoTRIgine (LaMICtal) 100 MG tablet, Take 100 mg by mouth Daily., Disp: , Rfl:   •  levocetirizine (XYZAL) 5 MG tablet, Take 1 tablet by mouth Daily., Disp: , Rfl:   •  lisinopril (PRINIVIL,ZESTRIL) 10 MG tablet, Take 1 tablet by mouth Daily., Disp: 30 tablet, Rfl: 5  •  Misc Natural Products (PROGESTERONE EX), Apply 80 mg topically 2 (Two) Times a Day., Disp: , Rfl:   •  nystatin (MYCOSTATIN) 333841 UNIT/GM cream, Apply 1 application topically to the appropriate area as directed Daily., Disp: , Rfl:   •  Thyroid (ARMOUR THYROID) 60 MG PO tablet, Take 1 tablet by mouth Daily., Disp: 60 tablet, Rfl: 5  •  triamterene-hydrochlorothiazide (DYAZIDE) 37.5-25 MG per capsule, TAKE ONE CAPSULE ONCE DAILY, Disp: 30 capsule, Rfl: 5  •  valACYclovir (Valtrex) 500 MG tablet, Take 1 tablet by mouth 2 (Two) Times a Day. (Patient taking differently: Take 500 mg by mouth 2 (Two) Times a Day As Needed.), Disp: 10 tablet, Rfl: 0  •  zolpidem (AMBIEN) 5 MG tablet, TAKE 1-2 TABLETS BY MOUTH AT BEDTIME, Disp: 60 tablet, Rfl: 2    Immunization History   Administered Date(s) Administered   • Flu Vaccine Quad PF >18YRS 10/20/2016, 12/07/2017, 12/04/2018   • Flulaval/Fluarix/Fluzone Quad 10/21/2020   • INFLUENZA SPLIT TRI 02/07/2013, 09/12/2013   • Influenza TIV (IM) 09/03/2010, 10/12/2011, 11/25/2014   • Tdap 11/25/2014   • Zostavax 12/05/2018   • flucelvax quad pfs =>4 YRS 01/17/2020        Health Maintenance Due   Topic Date Due   • ANNUAL PHYSICAL  05/21/1963   • ZOSTER VACCINE (2 of 3) 01/30/2019   • HEPATITIS C SCREENING  02/21/2019        Review of Systems   Constitutional: Negative for chills, fatigue and fever.   HENT: Negative for congestion, ear pain and sinus pressure.    Respiratory: Negative for cough, chest tightness, shortness of breath and wheezing.    Cardiovascular: Negative for chest pain and palpitations.   Gastrointestinal: Negative  "for abdominal pain, blood in stool and constipation.   Skin: Negative for color change.   Allergic/Immunologic: Positive for environmental allergies.   Neurological: Negative for dizziness, speech difficulty and headache.   Psychiatric/Behavioral: Negative for decreased concentration. The patient is nervous/anxious.         Vital Signs  Vitals:    02/25/21 1026   BP: 118/82   BP Location: Left arm   Patient Position: Sitting   Cuff Size: Adult   Pulse: 104   Temp: 97.7 °F (36.5 °C)   TempSrc: Temporal   Weight: 76.7 kg (169 lb)   Height: 163.8 cm (64.49\")   PainSc:   4   PainLoc: Generalized       Physical Exam  Vitals signs and nursing note reviewed.   Constitutional:       Appearance: She is well-developed.   HENT:      Head: Normocephalic and atraumatic.      Right Ear: External ear normal.      Left Ear: External ear normal.      Nose: Nose normal.      Mouth/Throat:      Pharynx: No oropharyngeal exudate.   Eyes:      Conjunctiva/sclera: Conjunctivae normal.      Pupils: Pupils are equal, round, and reactive to light.   Neck:      Musculoskeletal: Normal range of motion and neck supple.      Thyroid: No thyromegaly.      Vascular: No JVD.   Cardiovascular:      Rate and Rhythm: Normal rate and regular rhythm.      Heart sounds: Normal heart sounds. No murmur. No friction rub. No gallop.    Pulmonary:      Effort: Pulmonary effort is normal. No respiratory distress.      Breath sounds: Normal breath sounds. No wheezing or rales.   Abdominal:      General: Bowel sounds are normal. There is no distension.      Palpations: Abdomen is soft. There is no mass.      Tenderness: There is no abdominal tenderness. There is no guarding or rebound.      Hernia: No hernia is present.   Musculoskeletal: Normal range of motion.         General: No tenderness.   Lymphadenopathy:      Cervical: No cervical adenopathy.   Skin:     General: Skin is warm and dry.      Findings: No erythema or rash.   Neurological:      Mental " Status: She is alert and oriented to person, place, and time.      Cranial Nerves: No cranial nerve deficit.      Sensory: No sensory deficit.      Motor: No abnormal muscle tone.      Coordination: Coordination normal.      Deep Tendon Reflexes: Reflexes normal.   Psychiatric:         Behavior: Behavior normal.         Thought Content: Thought content normal.         Judgment: Judgment normal.          Procedures     Fall Risk Screen:  NELL Fall Risk Assessment has not been completed.    Health Habits and Functional and Cognitive Screening:  No flowsheet data found.    Depression [place]  PHQ-9 Total Score: 0     ACE III MINI             Assessment/Plan:    Diagnoses and all orders for this visit:    1. Preventative health care (Primary)    2. Benign essential hypertension    3. Mixed hyperlipidemia    4. Vitamin D deficiency    5. Gastroesophageal reflux disease without esophagitis    6. Current moderate episode of major depressive disorder without prior episode (CMS/[facility])    7. Fibromyalgia    8. Lumbar spondylosis    9. Migraine without aura and without status migrainosus, not intractable    Plan    Overall she remains in good health despite her long problem list.  She needs to be more physically active with which she agrees.  She is up-to-date on mammography and GYN care.  Colonoscopy was done in 2012, due next year.    Blood pressure is controlled on lisinopril and triamterene-hydrochlorothiazide.    Lipid panel is pending, continue healthy diet and avoidance of weight gain.    Vitamin D level is pending, continue daily supplemental vitamin D.    GERD is controlled on Dexilant.    Depression is reasonably well compensated on bupropion  a day, buspirone, duloxetine and lamotrigine.    Fibromyalgia is not limiting but symptoms persist.  She will continue duloxetine and once again I have strongly encouraged her to improve her physical activity.    Lumbar spondylosis also is not limiting.    Migraine  appears well controlled.  Continue almotriptan as needed.        Labs  Results for orders placed or performed in visit on 02/25/21   Comprehensive Metabolic Panel    Specimen: Blood   Result Value Ref Range    Glucose 86 65 - 99 mg/dL    BUN 15 6 - 20 mg/dL    Creatinine 1.09 (H) 0.57 - 1.00 mg/dL    Sodium 140 136 - 145 mmol/L    Potassium 4.7 3.5 - 5.2 mmol/L    Chloride 104 98 - 107 mmol/L    CO2 26.1 22.0 - 29.0 mmol/L    Calcium 9.4 8.6 - 10.5 mg/dL    Total Protein 6.9 6.0 - 8.5 g/dL    Albumin 4.30 3.50 - 5.20 g/dL    ALT (SGPT) 19 1 - 33 U/L    AST (SGOT) 22 1 - 32 U/L    Alkaline Phosphatase 72 39 - 117 U/L    Total Bilirubin 0.5 0.0 - 1.2 mg/dL    eGFR Non African Amer 51 (L) >60 mL/min/1.73    Globulin 2.6 gm/dL    A/G Ratio 1.7 g/dL    BUN/Creatinine Ratio 13.8 7.0 - 25.0    Anion Gap 9.9 5.0 - 15.0 mmol/L   Lipid Panel    Specimen: Blood   Result Value Ref Range    Total Cholesterol 265 (H) 0 - 200 mg/dL    Triglycerides 141 0 - 150 mg/dL    HDL Cholesterol 61 (H) 40 - 60 mg/dL    LDL Cholesterol  179 (H) 0 - 100 mg/dL    VLDL Cholesterol 25 5 - 40 mg/dL    LDL/HDL Ratio 2.88    Vitamin D 25 Hydroxy    Specimen: Blood   Result Value Ref Range    25 Hydroxy, Vitamin D 20.3 ng/ml   CBC Auto Differential    Specimen: Blood   Result Value Ref Range    WBC 5.80 3.40 - 10.80 10*3/mm3    RBC 4.73 3.77 - 5.28 10*6/mm3    Hemoglobin 15.0 12.0 - 15.9 g/dL    Hematocrit 44.5 34.0 - 46.6 %    MCV 94.1 79.0 - 97.0 fL    MCH 31.7 26.6 - 33.0 pg    MCHC 33.7 31.5 - 35.7 g/dL    RDW 11.9 (L) 12.3 - 15.4 %    RDW-SD 41.9 37.0 - 54.0 fl    MPV 10.3 6.0 - 12.0 fL    Platelets 258 140 - 450 10*3/mm3    Neutrophil % 60.3 42.7 - 76.0 %    Lymphocyte % 28.1 19.6 - 45.3 %    Monocyte % 7.8 5.0 - 12.0 %    Eosinophil % 2.6 0.3 - 6.2 %    Basophil % 1.0 0.0 - 1.5 %    Immature Grans % 0.2 0.0 - 0.5 %    Neutrophils, Absolute 3.50 1.70 - 7.00 10*3/mm3    Lymphocytes, Absolute 1.63 0.70 - 3.10 10*3/mm3    Monocytes, Absolute 0.45  0.10 - 0.90 10*3/mm3    Eosinophils, Absolute 0.15 0.00 - 0.40 10*3/mm3    Basophils, Absolute 0.06 0.00 - 0.20 10*3/mm3    Immature Grans, Absolute 0.01 0.00 - 0.05 10*3/mm3    nRBC 0.0 0.0 - 0.2 /100 WBC        Counseling was given to patient for the following topics: appropriate exercise daily walking, disease prevention and healthy eating habits.    Plan of care reviewed with patient at the conclusion of today's visit. Education was provided regarding diagnosis, management, and any prescribed or recommended OTC medications.Patient verbalizes understanding of and agreement with management plan.         Don Rodriguez MD

## 2021-04-06 RX ORDER — BUPROPION HYDROCHLORIDE 300 MG/1
300 TABLET ORAL DAILY
Qty: 30 TABLET | Refills: 5 | Status: SHIPPED | OUTPATIENT
Start: 2021-04-06 | End: 2021-09-16 | Stop reason: SDUPTHER

## 2021-04-06 RX ORDER — DEXLANSOPRAZOLE 60 MG/1
1 CAPSULE, DELAYED RELEASE ORAL DAILY
Qty: 30 CAPSULE | Refills: 5 | Status: SHIPPED | OUTPATIENT
Start: 2021-04-06 | End: 2021-09-16 | Stop reason: SDUPTHER

## 2021-04-06 RX ORDER — TRIAMTERENE AND HYDROCHLOROTHIAZIDE 37.5; 25 MG/1; MG/1
1 CAPSULE ORAL DAILY
Qty: 30 CAPSULE | Refills: 5 | Status: SHIPPED | OUTPATIENT
Start: 2021-04-06 | End: 2021-09-16 | Stop reason: SDUPTHER

## 2021-04-06 RX ORDER — VALACYCLOVIR HYDROCHLORIDE 500 MG/1
500 TABLET, FILM COATED ORAL 2 TIMES DAILY
Qty: 10 TABLET | Refills: 0 | Status: SHIPPED | OUTPATIENT
Start: 2021-04-06 | End: 2021-06-04 | Stop reason: SDUPTHER

## 2021-04-06 RX ORDER — DULOXETIN HYDROCHLORIDE 60 MG/1
60 CAPSULE, DELAYED RELEASE ORAL DAILY
Qty: 30 CAPSULE | Refills: 5 | Status: SHIPPED | OUTPATIENT
Start: 2021-04-06 | End: 2021-09-16 | Stop reason: SDUPTHER

## 2021-04-19 ENCOUNTER — TRANSCRIBE ORDERS (OUTPATIENT)
Dept: ADMINISTRATIVE | Facility: HOSPITAL | Age: 61
End: 2021-04-19

## 2021-04-19 DIAGNOSIS — Z12.31 VISIT FOR SCREENING MAMMOGRAM: Primary | ICD-10-CM

## 2021-05-24 ENCOUNTER — OFFICE VISIT (OUTPATIENT)
Dept: OBSTETRICS AND GYNECOLOGY | Facility: CLINIC | Age: 61
End: 2021-05-24

## 2021-05-24 ENCOUNTER — HOSPITAL ENCOUNTER (OUTPATIENT)
Dept: MAMMOGRAPHY | Facility: HOSPITAL | Age: 61
Discharge: HOME | End: 2021-05-24
Admitting: OBSTETRICS & GYNECOLOGY

## 2021-05-24 VITALS
HEIGHT: 66 IN | BODY MASS INDEX: 26.03 KG/M2 | DIASTOLIC BLOOD PRESSURE: 60 MMHG | WEIGHT: 162 LBS | SYSTOLIC BLOOD PRESSURE: 112 MMHG

## 2021-05-24 DIAGNOSIS — Z01.419 WOMEN'S ANNUAL ROUTINE GYNECOLOGICAL EXAMINATION: Primary | ICD-10-CM

## 2021-05-24 DIAGNOSIS — Z12.31 VISIT FOR SCREENING MAMMOGRAM: ICD-10-CM

## 2021-05-24 PROCEDURE — 77063 BREAST TOMOSYNTHESIS BI: CPT | Performed by: RADIOLOGY

## 2021-05-24 PROCEDURE — 77067 SCR MAMMO BI INCL CAD: CPT

## 2021-05-24 PROCEDURE — 77063 BREAST TOMOSYNTHESIS BI: CPT

## 2021-05-24 PROCEDURE — 77067 SCR MAMMO BI INCL CAD: CPT | Performed by: RADIOLOGY

## 2021-05-24 PROCEDURE — 99396 PREV VISIT EST AGE 40-64: CPT | Performed by: NURSE PRACTITIONER

## 2021-05-24 RX ORDER — PROGESTERONE 200 MG/1
200 CAPSULE ORAL DAILY
Qty: 30 CAPSULE | Refills: 12 | Status: SHIPPED | OUTPATIENT
Start: 2021-05-24 | End: 2022-05-25 | Stop reason: SDUPTHER

## 2021-05-24 RX ORDER — ALMOTRIPTAN 6.25 MG/1
TABLET, FILM COATED ORAL
COMMUNITY
End: 2021-08-25

## 2021-05-24 RX ORDER — PRASTERONE (DHEA) 50 MG
TABLET ORAL
COMMUNITY
End: 2021-09-16

## 2021-05-24 RX ORDER — TRIAMTERENE AND HYDROCHLOROTHIAZIDE 37.5; 25 MG/1; MG/1
CAPSULE ORAL
COMMUNITY
End: 2021-09-16

## 2021-05-24 RX ORDER — ARIPIPRAZOLE 2 MG/1
TABLET ORAL
COMMUNITY
End: 2021-09-16

## 2021-05-24 RX ORDER — ESTRADIOL 0.05 MG/D
1 FILM, EXTENDED RELEASE TRANSDERMAL 2 TIMES WEEKLY
Qty: 8 PATCH | Refills: 12 | Status: SHIPPED | OUTPATIENT
Start: 2021-05-24 | End: 2022-05-25 | Stop reason: SDUPTHER

## 2021-05-24 RX ORDER — TRIAMCINOLONE ACETONIDE 1 MG/G
CREAM TOPICAL
COMMUNITY
End: 2022-05-25

## 2021-05-24 RX ORDER — DULOXETIN HYDROCHLORIDE 30 MG/1
CAPSULE, DELAYED RELEASE ORAL
COMMUNITY
End: 2021-09-16

## 2021-05-24 RX ORDER — VALACYCLOVIR HYDROCHLORIDE 500 MG/1
TABLET, FILM COATED ORAL
COMMUNITY
End: 2021-09-16

## 2021-05-24 RX ORDER — BUSPIRONE HYDROCHLORIDE 30 MG/1
TABLET ORAL
COMMUNITY
End: 2021-09-16

## 2021-05-24 RX ORDER — NYSTATIN 100000 U/G
CREAM TOPICAL
COMMUNITY
End: 2021-09-16

## 2021-05-24 RX ORDER — LISINOPRIL 10 MG/1
TABLET ORAL
COMMUNITY
End: 2021-09-16

## 2021-05-24 RX ORDER — ESTRADIOL 0.05 MG/D
FILM, EXTENDED RELEASE TRANSDERMAL
COMMUNITY
End: 2021-09-16

## 2021-05-24 RX ORDER — BUPROPION HYDROCHLORIDE 300 MG/1
TABLET ORAL
COMMUNITY
End: 2021-09-16

## 2021-05-24 RX ORDER — BUSPIRONE HYDROCHLORIDE 7.5 MG/1
TABLET ORAL
COMMUNITY
End: 2021-09-16

## 2021-05-24 RX ORDER — NYSTATIN AND TRIAMCINOLONE ACETONIDE 100000; 1 [USP'U]/G; MG/G
OINTMENT TOPICAL
COMMUNITY
End: 2022-05-25 | Stop reason: SDUPTHER

## 2021-05-24 RX ORDER — NITROFURANTOIN MACROCRYSTALS 50 MG/1
CAPSULE ORAL
COMMUNITY
End: 2021-09-16

## 2021-05-24 RX ORDER — DEXLANSOPRAZOLE 60 MG/1
CAPSULE, DELAYED RELEASE ORAL
COMMUNITY
End: 2021-09-16

## 2021-05-24 RX ORDER — PROGESTERONE 200 MG/1
CAPSULE ORAL
COMMUNITY
End: 2021-05-24 | Stop reason: SDUPTHER

## 2021-05-24 RX ORDER — ONDANSETRON 4 MG/1
4 TABLET, FILM COATED ORAL EVERY 8 HOURS PRN
COMMUNITY

## 2021-05-24 RX ORDER — ESTRADIOL 0.1 MG/G
CREAM VAGINAL
Qty: 1 EACH | Refills: 12 | Status: SHIPPED | OUTPATIENT
Start: 2021-05-24 | End: 2022-05-11

## 2021-05-24 NOTE — PROGRESS NOTES
GYN Annual Exam     CC - Here for annual exam.        HPI  Batool Hampton is a 61 y.o. female, , who presents for annual well woman exam.  She is postmenopausal.  Patient denies vaginal bleeding. ..  Patient reports problems with: hot flashes and vaginal dryness. There were no changes to her medical or surgical history since her last visit.. Partner Status: Marital Status: .  New Partners since last visit: no.She is currently on prometrium and vivelle dot patch.  has been diagnosed with ALS in 2020.     Additional OB/GYN History   Current contraception: contraceptive methods: None  On HRT? Yes. Details: estradiol patch 0.05, progesterone  Last Pap : 2020  Last Completed Pap Smear     This patient has no relevant Health Maintenance data.        History of abnormal Pap smear: no  Family history of uterine, colon, breast, or ovarian cancer: yes - breast cancer in [facility] [place] [place]  Performs monthly Self-Breast Exam: yes  Last mammogram:   Last Completed Mammogram          Scheduled - MAMMOGRAM (Every 2 Years) Scheduled for 2019  Mammo Screening Digital Tomosynthesis Bilateral With CAD    10/08/2018  Mammo screening digital tomosynthesis bilateral w CAD    2017  Mammo Screening Digital Tomosynthesis Bilateral With CAD    10/27/2014  MAMMOGRAPHY DIAGNOSTIC BILATERAL              Last colonoscopy:  normal  Last Completed Colonoscopy     This patient has no relevant Health Maintenance data.        Last DEXA:  Pt. States has had BDS but unsure of date  Exercises Regularly: yes  Feelings of Anxiety or Depression: no      Tobacco Usage?: No   OB History        1    Para   0    Term   0       0    AB   1    Living   0       SAB        TAB        Ectopic        Molar        Multiple        Live Births                    Health Maintenance   Topic Date Due   • Annual Gynecologic Pelvic and Breast Exam  Never done   • ZOSTER VACCINE (2 of 3) 2019   • HEPATITIS C  "SCREENING  Never done   • PAP SMEAR  Never done   • INFLUENZA VACCINE  08/01/2021   • MAMMOGRAM  11/04/2021   • LIPID PANEL  02/25/2022   • ANNUAL PHYSICAL  02/26/2022   • COLORECTAL CANCER SCREENING  07/10/2022   • TDAP/TD VACCINES (2 - Td or Tdap) 11/25/2024   • COVID-19 Vaccine  Completed   • Pneumococcal Vaccine 0-64  Aged Out       The additional following portions of the patient's history were reviewed and updated as appropriate: allergies, current medications, past family history, past medical history, past social history, past surgical history and problem list.    Review of Systems   Constitutional: Negative.    HENT: Negative.    Eyes: Negative.    Respiratory: Negative.    Cardiovascular: Negative.    Gastrointestinal: Negative.    Endocrine: Negative.    Genitourinary: Negative.         Vaginal dryness, hot flashes   Musculoskeletal: Negative.    Skin: Negative.    Allergic/Immunologic: Negative.    Neurological: Negative.    Hematological: Negative.    Psychiatric/Behavioral: Negative.        I have reviewed and agree with the HPI, ROS, and historical information as entered above. Geetha Mtz, APRN    Objective   /60   Ht 167.6 cm (66\")   Wt 73.5 kg (162 lb)   BMI 26.15 kg/m²     Physical Exam  Exam conducted with a chaperone present.   Constitutional:       Appearance: Normal appearance.   Cardiovascular:      Rate and Rhythm: Normal rate and regular rhythm.   Chest:      Breasts:         Right: Normal.         Left: Normal.   Abdominal:      Palpations: Abdomen is soft.   Genitourinary:     General: Normal vulva.      Vagina: Normal.      Cervix: Normal.      Uterus: Normal.       Adnexa: Right adnexa normal and left adnexa normal.      Rectum: Normal.   Neurological:      Mental Status: She is alert.            Assessment and Plan    Problem List Items Addressed This Visit     None      Visit Diagnoses     Women's annual routine gynecological examination    -  Primary    Relevant " Medications    estradiol (Vivelle-Dot) 0.05 MG/24HR patch    Progesterone (PROMETRIUM) 200 MG capsule    estradiol (ESTRACE VAGINAL) 0.1 MG/GM vaginal cream    Other Relevant Orders    Pap IG, HPV-hr          1. GYN annual well woman exam.   2. Mammogram screening today. Results pending.   3. Reviewed monthly self breast exams.  Instructed to call with lumps, pain, or breast discharge.  Yearly mammograms ordered.  4. Recommended use of Vitamin D and getting adequate calcium in her diet. (1500mg)  5. Reviewed exercise as a preventative health measures.   6. Colonoscopy recommended.  7. Reviewed risks of ERT including increased risk of breast cancer, increased blood clots, increased heart disease.  Patient strongly desires to stay on or start ERT.  She understands we will use the lowest amount that adequately controls her symptoms.  8. Reccommended Flu Vaccine in Fall of each year.  9. RTC in 1 year or PRN with problems.    Geetha Mtz, APRN  05/24/2021

## 2021-06-01 DIAGNOSIS — Z01.419 WOMEN'S ANNUAL ROUTINE GYNECOLOGICAL EXAMINATION: ICD-10-CM

## 2021-06-04 ENCOUNTER — PATIENT MESSAGE (OUTPATIENT)
Dept: INTERNAL MEDICINE | Facility: CLINIC | Age: 61
End: 2021-06-04

## 2021-06-04 RX ORDER — VALACYCLOVIR HYDROCHLORIDE 500 MG/1
500 TABLET, FILM COATED ORAL 2 TIMES DAILY
Qty: 10 TABLET | Refills: 0 | Status: SHIPPED | OUTPATIENT
Start: 2021-06-04 | End: 2022-09-05 | Stop reason: SDUPTHER

## 2021-06-04 RX ORDER — ACYCLOVIR 50 MG/G
OINTMENT TOPICAL
Qty: 15 G | Refills: 0 | Status: SHIPPED | OUTPATIENT
Start: 2021-06-04 | End: 2022-09-05 | Stop reason: SDUPTHER

## 2021-06-04 NOTE — TELEPHONE ENCOUNTER
From: Batool Hampton  To: Don Rodriguez MD  Sent: 6/4/2021 2:26 PM EDT  Subject: Prescription Question    Need prescriptions for Valacyclovir and Aciclovir for nasal blisters extending towards upper lip. Haven't had this issue in awhile but stress level is way up. Thank you.

## 2021-06-29 RX ORDER — LISINOPRIL 10 MG/1
TABLET ORAL
Qty: 30 TABLET | Refills: 5 | Status: SHIPPED | OUTPATIENT
Start: 2021-06-29 | End: 2022-03-17

## 2021-08-25 DIAGNOSIS — G47.09 OTHER INSOMNIA: ICD-10-CM

## 2021-08-25 RX ORDER — ALMOTRIPTAN 6.25 MG/1
TABLET, FILM COATED ORAL
Qty: 10 TABLET | Refills: 5 | Status: SHIPPED | OUTPATIENT
Start: 2021-08-25 | End: 2021-09-16 | Stop reason: SDUPTHER

## 2021-08-25 RX ORDER — ZOLPIDEM TARTRATE 5 MG/1
TABLET ORAL
Qty: 60 TABLET | Refills: 2 | Status: SHIPPED | OUTPATIENT
Start: 2021-08-25 | End: 2022-03-17

## 2021-08-25 NOTE — TELEPHONE ENCOUNTER
Rx Refill Note  Requested Prescriptions     Pending Prescriptions Disp Refills   • zolpidem (AMBIEN) 5 MG tablet [Pharmacy Med Name: zolpidem 5 mg tablet] 60 tablet 2     Sig: TAKE 1-2 TABLETS BY MOUTH AT BEDTIME   • almotriptan (AXERT) 6.25 MG tablet [Pharmacy Med Name: almotriptan malate 6.25 mg tablet] 10 tablet 5     Sig: TAKE 1 TABLET BY MOUTH AT ONSET OF MIGRAINE (MAY REPEAT DOSE AFTER 2 HOURS IF NEEDED, MAX OF 2 TABS/DAY)      Last office visit with prescribing clinician: 2/25/2021      Next office visit with prescribing clinician: 9/8/2021            Kristin Marshall LPN  08/25/21, 11:46 EDT

## 2021-09-16 ENCOUNTER — LAB (OUTPATIENT)
Dept: LAB | Facility: HOSPITAL | Age: 61
End: 2021-09-16

## 2021-09-16 ENCOUNTER — OFFICE VISIT (OUTPATIENT)
Dept: INTERNAL MEDICINE | Facility: CLINIC | Age: 61
End: 2021-09-16

## 2021-09-16 VITALS
TEMPERATURE: 98.4 F | DIASTOLIC BLOOD PRESSURE: 78 MMHG | WEIGHT: 157.6 LBS | BODY MASS INDEX: 25.33 KG/M2 | HEIGHT: 66 IN | SYSTOLIC BLOOD PRESSURE: 126 MMHG | HEART RATE: 88 BPM

## 2021-09-16 DIAGNOSIS — E55.9 VITAMIN D DEFICIENCY: ICD-10-CM

## 2021-09-16 DIAGNOSIS — F32.1 CURRENT MODERATE EPISODE OF MAJOR DEPRESSIVE DISORDER WITHOUT PRIOR EPISODE (HCC): ICD-10-CM

## 2021-09-16 DIAGNOSIS — E78.2 MIXED HYPERLIPIDEMIA: Primary | ICD-10-CM

## 2021-09-16 DIAGNOSIS — G43.009 MIGRAINE WITHOUT AURA AND WITHOUT STATUS MIGRAINOSUS, NOT INTRACTABLE: ICD-10-CM

## 2021-09-16 DIAGNOSIS — M79.7 FIBROMYALGIA: ICD-10-CM

## 2021-09-16 DIAGNOSIS — K21.9 GASTROESOPHAGEAL REFLUX DISEASE WITHOUT ESOPHAGITIS: ICD-10-CM

## 2021-09-16 DIAGNOSIS — I10 BENIGN ESSENTIAL HYPERTENSION: ICD-10-CM

## 2021-09-16 PROCEDURE — 82306 VITAMIN D 25 HYDROXY: CPT

## 2021-09-16 PROCEDURE — 99214 OFFICE O/P EST MOD 30 MIN: CPT | Performed by: INTERNAL MEDICINE

## 2021-09-16 RX ORDER — BUPROPION HYDROCHLORIDE 150 MG/1
150 TABLET ORAL DAILY
Qty: 30 TABLET | Refills: 5 | Status: SHIPPED | OUTPATIENT
Start: 2021-09-16 | End: 2022-06-10 | Stop reason: SDUPTHER

## 2021-09-16 RX ORDER — DEXLANSOPRAZOLE 60 MG/1
1 CAPSULE, DELAYED RELEASE ORAL DAILY
Qty: 30 CAPSULE | Refills: 5 | Status: SHIPPED | OUTPATIENT
Start: 2021-09-16 | End: 2022-03-23

## 2021-09-16 RX ORDER — BUPROPION HYDROCHLORIDE 300 MG/1
300 TABLET ORAL DAILY
Qty: 30 TABLET | Refills: 5 | Status: SHIPPED | OUTPATIENT
Start: 2021-09-16 | End: 2022-06-10 | Stop reason: SDUPTHER

## 2021-09-16 RX ORDER — TRIAMTERENE AND HYDROCHLOROTHIAZIDE 37.5; 25 MG/1; MG/1
1 CAPSULE ORAL DAILY
Qty: 30 CAPSULE | Refills: 5 | Status: SHIPPED | OUTPATIENT
Start: 2021-09-16 | End: 2022-04-11

## 2021-09-16 RX ORDER — ALMOTRIPTAN 6.25 MG/1
12.5 TABLET, FILM COATED ORAL ONCE AS NEEDED
Qty: 10 TABLET | Refills: 5 | Status: SHIPPED | OUTPATIENT
Start: 2021-09-16 | End: 2023-01-13

## 2021-09-16 RX ORDER — DULOXETIN HYDROCHLORIDE 60 MG/1
60 CAPSULE, DELAYED RELEASE ORAL DAILY
Qty: 30 CAPSULE | Refills: 5 | Status: SHIPPED | OUTPATIENT
Start: 2021-09-16 | End: 2022-05-04

## 2021-09-16 NOTE — PROGRESS NOTES
[facility]     Batool Hampton  1960   [id number]      Patient Care Team:  Don Rodriguez MD as PCP - General (Internal Medicine)  Mk Silver MD as Consulting Physician (Obstetrics and Gynecology)    Chief Complaint::   Chief Complaint   Patient presents with   • Hyperlipidemia   • Hypertension        HPI  Ms. Hampton comes in for follow-up of her hyperlipidemia, vitamin D deficiency, hypertension, depression, GERD, fibromyalgia and migraine.  Overall she is doing well despite being under a great deal of stress with her  having been diagnosed with ALS.  Her mood is holding up and her fibromyalgia pain is not limiting.  Her headaches are not is responsive to ask her as they once were.    Chronic Conditions:      Patient Active Problem List   Diagnosis   • Lumbar spondylosis   • Fibromyalgia   • Depression   • Allergic rhinitis   • Hyperlipidemia   • Benign essential hypertension   • Migraine   • Gastroesophageal reflux disease without esophagitis   • Vitamin D deficiency   • Current moderate episode of major depressive disorder without prior episode (CMS/HCC)        Past Medical History:   Diagnosis Date   • Acute cholecystitis 11/2016   • Deviated nasal septum    • Fibromyalgia    • HNP (herniated nucleus pulposus), lumbar 2008    L5Si   • Hypertension    • Migraine    • MRSA (methicillin resistant staph aureus) culture positive 2008    Great toe L   • PTSD (post-traumatic stress disorder)        Past Surgical History:   Procedure Laterality Date   • KNEE SURGERY Bilateral 1988   • LAPAROSCOPIC CHOLECYSTECTOMY     • NASAL SEPTUM SURGERY  2010   • OOPHORECTOMY         Family History   Problem Relation Age of Onset   • BRCA 1/2 Maternal Grandmother 40   • BRCA 1/2 Cousin 23   • Cancer Father         Bladder   • Coronary artery disease Father 61        premature    • Pancreatic cancer Father    • Hypertension Father    • Lung cancer Mother    • Ovarian cancer Neg Hx         Social History     Socioeconomic History   • Marital status:      Spouse name: Not on file   • Number of children: Not on file   • Years of education: Not on file   • Highest education level: Not on file   Tobacco Use   • Smoking status: Never Smoker   • Smokeless tobacco: Never Used   Substance and Sexual Activity   • Alcohol use: Yes     Alcohol/week: 1.0 - 2.0 standard drinks     Types: 1 - 2 Glasses of wine per week     Comment: 4 days /week   • Sexual activity: Yes       Allergies   Allergen Reactions   • Codeine Unknown (See Comments)     Unknown   • Morphine Unknown (See Comments)     Unknown   • Tetracyclines & Related Unknown (See Comments)     Unknown         Current Outpatient Medications:   •  acyclovir (Zovirax) 5 % ointment, Apply  topically to the appropriate area as directed Every 3 (Three) Hours., Disp: 15 g, Rfl: 0  •  almotriptan (AXERT) 6.25 MG tablet, Take 2 tablets by mouth 1 (One) Time As Needed for Migraine for up to 30 doses. may repeat in 2 hours if needed, Disp: 10 tablet, Rfl: 5  •  buPROPion XL (WELLBUTRIN XL) 150 MG 24 hr tablet, Take 1 tablet by mouth Daily. Along with 300 mg, Disp: 30 tablet, Rfl: 5  •  buPROPion XL (WELLBUTRIN XL) 300 MG 24 hr tablet, Take 1 tablet by mouth Daily., Disp: 30 tablet, Rfl: 5  •  busPIRone (BUSPAR) 15 MG tablet, Take one tablet twice a day (Patient taking differently: Take 15 mg by mouth 2 (Two) Times a Day As Needed.), Disp: 60 tablet, Rfl: 5  •  dexlansoprazole (Dexilant) 60 MG capsule, Take 1 capsule by mouth Daily., Disp: 30 capsule, Rfl: 5  •  diclofenac (VOLTAREN) 1 % gel gel, Apply 1 g topically to the appropriate area as directed Daily., Disp: , Rfl:   •  DULoxetine (CYMBALTA) 60 MG capsule, Take 1 capsule by mouth Daily., Disp: 30 capsule, Rfl: 5  •  estradiol (ESTRACE VAGINAL) 0.1 MG/GM vaginal cream, Insert 1 gram twice weekly, Disp: 1 each, Rfl: 12  •  estradiol (Vivelle-Dot) 0.05 MG/24HR patch, Place 1 patch on the skin as  "directed by provider 2 (Two) Times a Week for 364 days., Disp: 8 patch, Rfl: 12  •  lamoTRIgine (LaMICtal) 100 MG tablet, Take 100 mg by mouth Daily., Disp: , Rfl:   •  levocetirizine (XYZAL) 5 MG tablet, Take 1 tablet by mouth Daily., Disp: , Rfl:   •  lisinopril (PRINIVIL,ZESTRIL) 10 MG tablet, TAKE ONE TABLET BY MOUTH ONCE DAILY, Disp: 30 tablet, Rfl: 5  •  nystatin-triamcinolone (MYCOLOG) 873196-8.1 UNIT/GM-% ointment, nystatin-triamcinolone 100,000 unit/gram-0.1 % topical ointment, Disp: , Rfl:   •  ondansetron (ZOFRAN) 4 MG tablet, Take 4 mg by mouth Every 8 (Eight) Hours As Needed., Disp: , Rfl:   •  Progesterone (PROMETRIUM) 200 MG capsule, Take 1 capsule by mouth Daily., Disp: 30 capsule, Rfl: 12  •  triamcinolone (KENALOG) 0.1 % cream, triamcinolone acetonide 0.1 % topical cream, Disp: , Rfl:   •  triamterene-hydrochlorothiazide (DYAZIDE) 37.5-25 MG per capsule, Take 1 capsule by mouth Daily., Disp: 30 capsule, Rfl: 5  •  valACYclovir (Valtrex) 500 MG tablet, Take 1 tablet by mouth 2 (Two) Times a Day. (Patient taking differently: Take 500 mg by mouth 2 (Two) Times a Day As Needed.), Disp: 10 tablet, Rfl: 0  •  zolpidem (AMBIEN) 5 MG tablet, TAKE 1-2 TABLETS BY MOUTH AT BEDTIME, Disp: 60 tablet, Rfl: 2    Review of Systems   Constitutional: Negative.    Respiratory: Negative.  Negative for chest tightness and shortness of breath.    Cardiovascular: Negative.  Negative for chest pain.   Gastrointestinal: Negative for abdominal pain, blood in stool, constipation and diarrhea.   Musculoskeletal: Positive for back pain and myalgias.        Vital Signs  Vitals:    09/16/21 1137   BP: 126/78   BP Location: Left arm   Patient Position: Sitting   Cuff Size: Adult   Pulse: 88   Temp: 98.4 °F (36.9 °C)   Weight: 71.5 kg (157 lb 9.6 oz)   Height: 167.6 cm (65.98\")   PainSc: 0-No pain       Physical Exam  Vitals reviewed.   Constitutional:       Appearance: She is well-developed.   HENT:      Head: Normocephalic and " atraumatic.   Cardiovascular:      Rate and Rhythm: Normal rate and regular rhythm.      Heart sounds: Normal heart sounds. No murmur heard.     Pulmonary:      Effort: Pulmonary effort is normal.      Breath sounds: Normal breath sounds.   Neurological:      Mental Status: She is alert and oriented to person, place, and time.          Procedures    ACE III MINI             Assessment/Plan:    Diagnoses and all orders for this visit:    1. Mixed hyperlipidemia (Primary)  -     Cancel: Comprehensive Metabolic Panel; Future  -     Cancel: Lipid Panel; Future    2. Vitamin D deficiency  -     Vitamin D 25 Hydroxy; Future    3. Benign essential hypertension    4. Current moderate episode of major depressive disorder without prior episode (CMS/HCC)    5. Gastroesophageal reflux disease without esophagitis    6. Fibromyalgia    7. Migraine without aura and without status migrainosus, not intractable    Other orders  -     almotriptan (AXERT) 6.25 MG tablet; Take 2 tablets by mouth 1 (One) Time As Needed for Migraine for up to 30 doses. may repeat in 2 hours if needed  Dispense: 10 tablet; Refill: 5  -     triamterene-hydrochlorothiazide (DYAZIDE) 37.5-25 MG per capsule; Take 1 capsule by mouth Daily.  Dispense: 30 capsule; Refill: 5  -     DULoxetine (CYMBALTA) 60 MG capsule; Take 1 capsule by mouth Daily.  Dispense: 30 capsule; Refill: 5  -     dexlansoprazole (Dexilant) 60 MG capsule; Take 1 capsule by mouth Daily.  Dispense: 30 capsule; Refill: 5  -     buPROPion XL (WELLBUTRIN XL) 300 MG 24 hr tablet; Take 1 tablet by mouth Daily.  Dispense: 30 tablet; Refill: 5  -     buPROPion XL (WELLBUTRIN XL) 150 MG 24 hr tablet; Take 1 tablet by mouth Daily. Along with 300 mg  Dispense: 30 tablet; Refill: 5    Plan    Lipids are well controlled, continue healthy diet and avoidance of weight gain.    Vitamin D level is pending, will follow up results when available.    Blood pressure is controlled on lisinopril and  triamterene-hydrochlorothiazide.    Mood is surprisingly well compensated on bupropion, buspirone, lamotrigine.    GERD is well controlled on pantoprazole.    Fibromyalgia is managed with regular movement and diclofenac gel.    She will increase Axert to 12 mg a day for better efficacy.    Patient's Body mass index is 25.45 kg/m². indicating that she is within normal range (BMI 18.5-24.9). No BMI management plan needed..        Plan of care reviewed with patient at the conclusion of today's visit. Education was provided regarding diagnosis, management, and any prescribed or recommended OTC medications.Patient verbalizes understanding of and agreement with management plan.         Don Rodriguez MD

## 2021-09-17 LAB — 25(OH)D3 SERPL-MCNC: 34.3 NG/ML

## 2022-03-17 DIAGNOSIS — G47.09 OTHER INSOMNIA: ICD-10-CM

## 2022-03-17 RX ORDER — ZOLPIDEM TARTRATE 5 MG/1
TABLET ORAL
Qty: 60 TABLET | Refills: 2 | Status: SHIPPED | OUTPATIENT
Start: 2022-03-17 | End: 2022-06-23

## 2022-03-17 RX ORDER — LISINOPRIL 10 MG/1
TABLET ORAL
Qty: 30 TABLET | Refills: 5 | Status: SHIPPED | OUTPATIENT
Start: 2022-03-17 | End: 2022-09-08

## 2022-03-17 NOTE — TELEPHONE ENCOUNTER
Rx Refill Note  Requested Prescriptions     Pending Prescriptions Disp Refills   • lisinopril (PRINIVIL,ZESTRIL) 10 MG tablet [Pharmacy Med Name: lisinopril 10 mg tablet] 30 tablet 5     Sig: TAKE ONE TABLET BY MOUTH ONCE DAILY   • zolpidem (AMBIEN) 5 MG tablet [Pharmacy Med Name: zolpidem 5 mg tablet] 60 tablet 2     Sig: TAKE 1-2 TABLETS BY MOUTH AT BEDTIME      Last office visit with prescribing clinician: 9/16/2021      Next office visit with prescribing clinician: 4/1/2022     LA: 08/25/21 #60 2R             Kristel Wiggins LPN  03/17/22, 10:28 EDT

## 2022-03-22 NOTE — TELEPHONE ENCOUNTER
Rx Refill Note  Requested Prescriptions     Pending Prescriptions Disp Refills   • Dexilant 60 MG capsule [Pharmacy Med Name: Dexilant 60 mg capsule, delayed release] 30 capsule 5     Sig: TAKE ONE CAPSULE ONCE DAILY      Last office visit with prescribing clinician: 9/16/2021      Next office visit with prescribing clinician: 4/1/2022            Kristel Wiggins LPN  03/22/22, 13:42 EDT

## 2022-03-23 RX ORDER — DEXLANSOPRAZOLE 60 MG/1
CAPSULE, DELAYED RELEASE ORAL
Qty: 30 CAPSULE | Refills: 5 | Status: SHIPPED | OUTPATIENT
Start: 2022-03-23 | End: 2022-09-07 | Stop reason: SDUPTHER

## 2022-03-24 ENCOUNTER — PRIOR AUTHORIZATION (OUTPATIENT)
Dept: INTERNAL MEDICINE | Facility: CLINIC | Age: 62
End: 2022-03-24

## 2022-04-11 RX ORDER — TRIAMTERENE AND HYDROCHLOROTHIAZIDE 37.5; 25 MG/1; MG/1
CAPSULE ORAL
Qty: 30 CAPSULE | Refills: 5 | Status: SHIPPED | OUTPATIENT
Start: 2022-04-11 | End: 2022-09-07 | Stop reason: SDUPTHER

## 2022-05-04 RX ORDER — DULOXETIN HYDROCHLORIDE 60 MG/1
CAPSULE, DELAYED RELEASE ORAL
Qty: 30 CAPSULE | Refills: 5 | Status: SHIPPED | OUTPATIENT
Start: 2022-05-04 | End: 2022-09-07 | Stop reason: SDUPTHER

## 2022-05-04 NOTE — TELEPHONE ENCOUNTER
Rx Refill Note  Requested Prescriptions     Pending Prescriptions Disp Refills   • DULoxetine (CYMBALTA) 60 MG capsule [Pharmacy Med Name: duloxetine 60 mg capsule,delayed release] 30 capsule 5     Sig: TAKE ONE CAPSULE ONCE DAILY      Last office visit with prescribing clinician: 9/16/2021      Next office visit with prescribing clinician: Visit date not found            Marion Juarez RN  05/04/22, 09:35 EDT

## 2022-05-06 ENCOUNTER — TELEPHONE (OUTPATIENT)
Dept: ORTHOPEDIC SURGERY | Facility: CLINIC | Age: 62
End: 2022-05-06

## 2022-05-06 NOTE — TELEPHONE ENCOUNTER
Ailyn,    We have not seen the patient for 2 years, will we provide a new MRI order or do we need to see her in office first?    Thank you,  Randolph ELKINS(R)

## 2022-05-06 NOTE — TELEPHONE ENCOUNTER
Caller: MERA PAZ     Relationship: SELF     Best call back number: [phone]    What form or medical record are you requesting: MRI OF BILATERAL HIPS     Who is requesting this form or medical record from you: PATIENT     How would you like to receive the form or medical records (pick-up, mail, fax): PATIENT     Timeframe paperwork needed: ASAP     Additional notes: PATIENT CALLED AND STATED THAT  HAD ORDERED MRI OF BILATERAL HIPS IN 2020 BUT THE PATIENTS  HAS HAD HEALTH PROBLEMS, THE PATIENT WOULD LIKE THE ORDER SENT TO [facility] IN [facility] CLOSER TO THE PATIENT

## 2022-05-09 NOTE — TELEPHONE ENCOUNTER
Please schedule patient with Dr. Vasquez or with a PA on a day and time Dr. Vasquez is in the office.    Thank you,    Randolph ELKINS(R)

## 2022-05-09 NOTE — TELEPHONE ENCOUNTER
Spoke with Dr. Vasquez--since patient has not been seen since 2020 she will need to come in for repeat evaluation and updated plain film imaging.

## 2022-05-11 ENCOUNTER — OFFICE VISIT (OUTPATIENT)
Dept: ORTHOPEDIC SURGERY | Facility: CLINIC | Age: 62
End: 2022-05-11

## 2022-05-11 VITALS
WEIGHT: 153.2 LBS | HEIGHT: 66 IN | SYSTOLIC BLOOD PRESSURE: 110 MMHG | BODY MASS INDEX: 24.62 KG/M2 | DIASTOLIC BLOOD PRESSURE: 71 MMHG

## 2022-05-11 DIAGNOSIS — M16.11 PRIMARY OSTEOARTHRITIS OF RIGHT HIP: Primary | ICD-10-CM

## 2022-05-11 PROCEDURE — 99214 OFFICE O/P EST MOD 30 MIN: CPT | Performed by: ORTHOPAEDIC SURGERY

## 2022-05-11 RX ORDER — IMIPRAMINE HYDROCHLORIDE 10 MG/1
10-30 TABLET, FILM COATED ORAL NIGHTLY
COMMUNITY
Start: 2022-02-14

## 2022-05-11 RX ORDER — LEVOCETIRIZINE DIHYDROCHLORIDE 5 MG/1
TABLET, FILM COATED ORAL
COMMUNITY
End: 2022-05-25

## 2022-05-11 RX ORDER — HYDROQUINONE 40 MG/G
CREAM TOPICAL
COMMUNITY
Start: 2022-04-06

## 2022-05-11 RX ORDER — TRETINOIN 0.5 MG/G
CREAM TOPICAL
COMMUNITY
Start: 2022-04-04

## 2022-05-11 NOTE — PROGRESS NOTES
[facility] Note    Subjective     Chief Complaint   Patient presents with   • Follow-up     Right Hip Pain        HPI    It has been 2  year(s) since Ms. Hampton's last visit. She returns to clinic today for follow-up of right hip pain located in the groin region. The issue has been ongoing for 2 year(s). She rates her pain a cannot rate/10 on the pain scale. Previous/current treatments: nothing. Current symptoms: pain, popping, stiffness and giving way/buckling the quality is stabbing and throbbing. The pain is worse with walking, standing, sitting, climbing stairs and sleeping; lying down improve the pain. Overall, she is doing worse.  No previous injections.  Pain is located in the groin region.  Intermittent, and difficulty walking distances with her dog or at the mall.    I have reviewed the following portions of the patient's history and agree with: History of Present Illness and Review of Systems    Patient Active Problem List   Diagnosis   • Lumbar spondylosis   • Fibromyalgia   • Depression   • Allergic rhinitis   • Hyperlipidemia   • Benign essential hypertension   • Migraine   • Gastroesophageal reflux disease without esophagitis   • Vitamin D deficiency   • Current moderate episode of major depressive disorder without prior episode (HCC)     Past Medical History:   Diagnosis Date   • Acute cholecystitis 11/2016   • Deviated nasal septum    • Fibromyalgia    • HNP (herniated nucleus pulposus), lumbar 2008    L5Si   • Hypertension    • Migraine    • MRSA (methicillin resistant staph aureus) culture positive 2008    Great toe L   • PTSD (post-traumatic stress disorder)       Past Surgical History:   Procedure Laterality Date   • KNEE SURGERY Bilateral 1988   • LAPAROSCOPIC CHOLECYSTECTOMY     • NASAL SEPTUM SURGERY  2010   • OOPHORECTOMY        Family History   Problem Relation Age of Onset   • BRCA 1/2 Maternal Grandmother 40   • BRCA 1/2 Cousin 23   • Cancer Father          Bladder   • Coronary artery disease Father 61        premature    • Pancreatic cancer Father    • Hypertension Father    • Lung cancer Mother    • Ovarian cancer Neg Hx      Social History     Socioeconomic History   • Marital status:    Tobacco Use   • Smoking status: Never Smoker   • Smokeless tobacco: Never Used   Substance and Sexual Activity   • Alcohol use: Yes     Alcohol/week: 1.0 - 2.0 standard drink     Types: 1 - 2 Glasses of wine per week     Comment: 4 days /week   • Sexual activity: Yes      Current Outpatient Medications on File Prior to Visit   Medication Sig Dispense Refill   • acyclovir (Zovirax) 5 % ointment Apply  topically to the appropriate area as directed Every 3 (Three) Hours. 15 g 0   • almotriptan (AXERT) 6.25 MG tablet Take 2 tablets by mouth 1 (One) Time As Needed for Migraine for up to 30 doses. may repeat in 2 hours if needed 10 tablet 5   • buPROPion XL (WELLBUTRIN XL) 150 MG 24 hr tablet Take 1 tablet by mouth Daily. Along with 300 mg 30 tablet 5   • buPROPion XL (WELLBUTRIN XL) 300 MG 24 hr tablet Take 1 tablet by mouth Daily. 30 tablet 5   • busPIRone (BUSPAR) 15 MG tablet Take one tablet twice a day (Patient taking differently: Take 15 mg by mouth 2 (Two) Times a Day As Needed.) 60 tablet 5   • Dexilant 60 MG capsule TAKE ONE CAPSULE ONCE DAILY 30 capsule 5   • diclofenac (VOLTAREN) 1 % gel gel Apply 1 g topically to the appropriate area as directed Daily.     • DULoxetine (CYMBALTA) 60 MG capsule TAKE ONE CAPSULE ONCE DAILY 30 capsule 5   • estradiol (Vivelle-Dot) 0.05 MG/24HR patch Place 1 patch on the skin as directed by provider 2 (Two) Times a Week for 364 days. 8 patch 12   • hydroquinone 4 % cream MIX WITH MOISTURIZER AND APPLY TO FACE TWICE DAILY FOR 12 WEEKS     • imipramine (TOFRANIL) 10 MG tablet TAKE 1 TO 3 TABLETS AT BEDTIME     • lamoTRIgine (LaMICtal) 100 MG tablet Take 100 mg by mouth Daily.     • levocetirizine (XYZAL) 5 MG tablet Take 1 tablet by mouth  Daily.     • levocetirizine (XYZAL) 5 MG tablet Xyzal 5 mg tablet   Take 1 tablet every day by oral route.     • lisinopril (PRINIVIL,ZESTRIL) 10 MG tablet TAKE ONE TABLET BY MOUTH ONCE DAILY 30 tablet 5   • nystatin-triamcinolone (MYCOLOG) 148409-2.1 UNIT/GM-% ointment nystatin-triamcinolone 100,000 unit/gram-0.1 % topical ointment     • ondansetron (ZOFRAN) 4 MG tablet Take 4 mg by mouth Every 8 (Eight) Hours As Needed.     • Progesterone (PROMETRIUM) 200 MG capsule Take 1 capsule by mouth Daily. 30 capsule 12   • tretinoin (RETIN-A) 0.05 % cream APPLY TOPICALLY TO FACE EVERY NIGHT AT BEDTIME     • triamcinolone (KENALOG) 0.1 % cream triamcinolone acetonide 0.1 % topical cream     • triamterene-hydrochlorothiazide (DYAZIDE) 37.5-25 MG per capsule TAKE ONE CAPSULE ONCE DAILY 30 capsule 5   • valACYclovir (Valtrex) 500 MG tablet Take 1 tablet by mouth 2 (Two) Times a Day. (Patient taking differently: Take 500 mg by mouth 2 (Two) Times a Day As Needed.) 10 tablet 0   • zolpidem (AMBIEN) 5 MG tablet TAKE 1-2 TABLETS BY MOUTH AT BEDTIME 60 tablet 2   • [DISCONTINUED] estradiol (ESTRACE VAGINAL) 0.1 MG/GM vaginal cream Insert 1 gram twice weekly 1 each 12     No current facility-administered medications on file prior to visit.      Allergies   Allergen Reactions   • Codeine Unknown (See Comments)     Unknown   • Morphine Unknown (See Comments)     Unknown   • Tetracyclines & Related Unknown (See Comments)     Unknown   • Sulfa Antibiotics Rash     And swelling with skin discoloration        Review of Systems   Constitutional: Negative.    Eyes: Negative.    Respiratory: Negative.    Cardiovascular: Negative.    Gastrointestinal: Negative.    Endocrine: Negative.    Genitourinary: Negative.    Musculoskeletal: Positive for arthralgias, back pain, neck pain and neck stiffness.   Skin: Negative.    Allergic/Immunologic: Positive for environmental allergies.   Neurological: Positive for dizziness, light-headedness and  "headaches.   Hematological: Negative.    Psychiatric/Behavioral: Positive for sleep disturbance.        Objective      Physical Exam  /71   Ht 167.6 cm (65.98\")   Wt 69.5 kg (153 lb 3.2 oz)   BMI 24.74 kg/m²     Body mass index is 24.74 kg/m².    General:   Mental Status:  Alert   Appearance: Cooperative, in no acute distress   Build and Nutrition: Well-nourished well-developed female   Orientation: Alert and oriented to person, place and time   Posture: Normal   Gait: Nonantalgic    Integument:              Right hip: No skin lesions, no rash, no ecchymosis     Lower Extremities:              Right Hip:                          Tenderness:    None                          Swelling:          None                          Crepitus:          None                          Atrophy:           None                          Range of motion:        External Rotation:       30°                                                              Internal Rotation:        30°                                                              Flexion:                       100°                                                              Extension:                   0°           Instability:        None  Deformities:     None  Functional testing:  Negative Stinchfield                          No leg length discrepancy    Imaging/Studies  Imaging Results (Last 24 Hours)     Procedure Component Value Units Date/Time    XR Hip With or Without Pelvis 2 - 3 View Right [[id number]] Resulted: 05/11/22 1516     Updated: 05/11/22 1517    Narrative:      Right Hip Radiographs  Indication: right hip pain  Views: low AP pelvis and lateral of the right hip    Comparison: 6/1/2020    Findings:   Arthritic changes are seen, with osteophytes at the head neck junction,   joint space narrowing, no acute bony abnormalities.  Mild worsening   compared to the previous imaging.            Assessment and Plan     Diagnoses and all orders for this " visit:    1. Primary osteoarthritis of right hip (Primary)  -     XR Hip With or Without Pelvis 2 - 3 View Right        1. Primary osteoarthritis of right hip        I reviewed my findings with the patient.  We discussed treatment options, and she would like to proceed with a trial of an intra-articular hip injection under fluoroscopic guidance.  We will arrange for that a mutually convenient time.    Return in about 4 weeks (around 6/8/2022) for After Hip Injection.      Ned Vasquez MD  05/11/22  15:29 EDT

## 2022-05-12 ENCOUNTER — TRANSCRIBE ORDERS (OUTPATIENT)
Dept: ADMINISTRATIVE | Facility: HOSPITAL | Age: 62
End: 2022-05-12

## 2022-05-12 DIAGNOSIS — Z12.31 VISIT FOR SCREENING MAMMOGRAM: Primary | ICD-10-CM

## 2022-05-25 ENCOUNTER — OFFICE VISIT (OUTPATIENT)
Dept: OBSTETRICS AND GYNECOLOGY | Facility: CLINIC | Age: 62
End: 2022-05-25

## 2022-05-25 VITALS — BODY MASS INDEX: 24.64 KG/M2 | WEIGHT: 152.6 LBS | DIASTOLIC BLOOD PRESSURE: 60 MMHG | SYSTOLIC BLOOD PRESSURE: 122 MMHG

## 2022-05-25 DIAGNOSIS — Z01.419 WOMEN'S ANNUAL ROUTINE GYNECOLOGICAL EXAMINATION: ICD-10-CM

## 2022-05-25 DIAGNOSIS — G43.009 MIGRAINE WITHOUT AURA AND WITHOUT STATUS MIGRAINOSUS, NOT INTRACTABLE: Primary | ICD-10-CM

## 2022-05-25 PROCEDURE — 99396 PREV VISIT EST AGE 40-64: CPT | Performed by: OBSTETRICS & GYNECOLOGY

## 2022-05-25 RX ORDER — PROGESTERONE 200 MG/1
200 CAPSULE ORAL DAILY
Qty: 30 CAPSULE | Refills: 12 | Status: SHIPPED | OUTPATIENT
Start: 2022-05-25 | End: 2023-06-19

## 2022-05-25 RX ORDER — ESTRADIOL 0.05 MG/D
1 FILM, EXTENDED RELEASE TRANSDERMAL 2 TIMES WEEKLY
Qty: 8 PATCH | Refills: 12 | Status: SHIPPED | OUTPATIENT
Start: 2022-05-26 | End: 2023-05-25

## 2022-05-25 RX ORDER — NYSTATIN AND TRIAMCINOLONE ACETONIDE 100000; 1 [USP'U]/G; MG/G
OINTMENT TOPICAL 2 TIMES WEEKLY
Qty: 60 G | Refills: 1 | Status: SHIPPED | OUTPATIENT
Start: 2022-05-26

## 2022-05-25 NOTE — PROGRESS NOTES
GYN Annual Exam     CC - Here for annual exam.        HPI  Batool Hampton is a 62 y.o. female, , who presents for annual well woman exam.  She is postmenopausal.  Patient denies vaginal bleeding. ..  Patient reports problems with: none. There were no changes to her medical or surgical history since her last visit.. Partner Status: Marital Status: .  New Partners since last visit: no.      She is currently on HRT and doing well. Discussed risks associated and all questions answered.     She is doing well on steroid cream for vaginal irritation.     Additional OB/GYN History   Current contraception: none  Desires to: discuss contraception  On HRT? Yes. Details: oral progesterone, and estradiol patch  Last Pap : 21. Results: normal  Last Completed Pap Smear          Ordered - PAP SMEAR (Every 3 Years) Ordered on 2021  SCANNED - PAP SMEAR              History of abnormal Pap smear: patient reports she is unsure and it does not show in records.  Family history of uterine, colon, breast, or ovarian cancer: yes - MG- breast  Performs monthly Self-Breast Exam: no  Last mammogram: 21. Done at [facility], normal return annually.    Last Completed Mammogram          Scheduled - MAMMOGRAM (Every 2 Years) Scheduled for 6/10/2022    2021  Mammo Screening Digital Tomosynthesis Bilateral With CAD    2019  Mammo Screening Digital Tomosynthesis Bilateral With CAD    10/08/2018  Mammo screening digital tomosynthesis bilateral w CAD    2017  Mammo Screening Digital Tomosynthesis Bilateral With CAD    10/27/2014  MAMMOGRAPHY DIAGNOSTIC BILATERAL              Last colonoscopy: 7/10/12, return in 10 years.  Last Completed Colonoscopy          COLORECTAL CANCER SCREENING (COLONOSCOPY - Every 10 Years) Next due on 7/10/2022    07/10/2012  COLONOSCOPY (Done)              Last DEXA: Unknown date and results were Osteoporosis  Exercises Regularly: no  Feelings of Anxiety or  Depression: yes - both      Tobacco Usage?: No   OB History        1    Para   0    Term   0       0    AB   1    Living   0       SAB        IAB        Ectopic        Molar        Multiple        Live Births                    Health Maintenance   Topic Date Due   • ZOSTER VACCINE (2 of 3) 2019   • HEPATITIS C SCREENING  Never done   • LIPID PANEL  2022   • ANNUAL PHYSICAL  2022   • COVID-19 Vaccine (4 - Booster for Pfizer series) 2022   • Annual Gynecologic Pelvic and Breast Exam  2022   • COLORECTAL CANCER SCREENING  07/10/2022   • INFLUENZA VACCINE  2022   • MAMMOGRAM  2023   • PAP SMEAR  2024   • TDAP/TD VACCINES (2 - Td or Tdap) 2024   • Pneumococcal Vaccine 0-64  Aged Out       The additional following portions of the patient's history were reviewed and updated as appropriate: allergies, current medications, past family history, past medical history, past social history, past surgical history and problem list.    Review of Systems   Constitutional: Negative.    Respiratory: Negative.    Cardiovascular: Negative.    Gastrointestinal: Negative.    Genitourinary: Negative.    Musculoskeletal: Negative.    Skin: Negative.    Neurological: Negative.    Psychiatric/Behavioral: Negative.        I have reviewed and agree with the HPI, ROS, and historical information as entered above. Dusty Arnold MD    Objective   /60   Wt 69.2 kg (152 lb 9.6 oz)   BMI 24.64 kg/m²     Physical Exam  Vitals reviewed. Exam conducted with a chaperone present.   Constitutional:       Appearance: Normal appearance. She is normal weight.   HENT:      Head: Normocephalic and atraumatic.   Cardiovascular:      Rate and Rhythm: Normal rate and regular rhythm.   Pulmonary:      Effort: Pulmonary effort is normal.      Breath sounds: Normal breath sounds.   Chest:   Breasts:      Right: Normal.      Left: Normal.       Abdominal:      General: Abdomen is flat.  Bowel sounds are normal.      Palpations: Abdomen is soft.   Genitourinary:     General: Normal vulva.      Vagina: Normal.      Cervix: Normal.      Uterus: Normal.       Adnexa: Right adnexa normal and left adnexa normal.   Musculoskeletal:      Cervical back: Neck supple.   Skin:     General: Skin is warm and dry.   Neurological:      Mental Status: She is alert and oriented to person, place, and time.   Psychiatric:         Mood and Affect: Mood normal.         Behavior: Behavior normal.            Assessment and Plan    Problem List Items Addressed This Visit        Neuro    Migraine - Primary    Relevant Medications    lamoTRIgine (LaMICtal) 100 MG tablet    almotriptan (AXERT) 6.25 MG tablet    buPROPion XL (WELLBUTRIN XL) 300 MG 24 hr tablet    buPROPion XL (WELLBUTRIN XL) 150 MG 24 hr tablet    DULoxetine (CYMBALTA) 60 MG capsule    imipramine (TOFRANIL) 10 MG tablet    Other Relevant Orders    Ambulatory Referral to Neurology      Other Visit Diagnoses     Women's annual routine gynecological examination        Relevant Medications    estradiol (Vivelle-Dot) 0.05 MG/24HR patch (Start on 5/26/2022)    Progesterone (PROMETRIUM) 200 MG capsule    Other Relevant Orders    LIQUID-BASED PAP SMEAR, [facility] ([facility])    DEXA Bone Density Axial        1.  Annual gynecologic exam performed today including breast and pelvic exam.  Pap smear was performed.  She is up-to-date on mammography and age-appropriate labs.  We will schedule DEXA screening.  Preventive care information discussed today and all questions answered.Return in about 1 year (around 5/25/2023) for DEXA ordered- can set up at any time, Annual physical.   2.  Continue steroid vaginal ointment as needed for vaginal irritation.  3.  We will continue HRT as she understands risks and consents.    Dusty Arnold MD  05/25/2022

## 2022-05-31 LAB — REF LAB TEST METHOD: NORMAL

## 2022-06-09 ENCOUNTER — TELEPHONE (OUTPATIENT)
Dept: ORTHOPEDIC SURGERY | Facility: CLINIC | Age: 62
End: 2022-06-09

## 2022-06-10 ENCOUNTER — HOSPITAL ENCOUNTER (OUTPATIENT)
Dept: MAMMOGRAPHY | Facility: HOSPITAL | Age: 62
Discharge: HOME | End: 2022-06-10
Admitting: OBSTETRICS & GYNECOLOGY

## 2022-06-10 ENCOUNTER — DOCUMENTATION (OUTPATIENT)
Dept: ORTHOPEDIC SURGERY | Facility: CLINIC | Age: 62
End: 2022-06-10

## 2022-06-10 ENCOUNTER — OFFICE VISIT (OUTPATIENT)
Dept: INTERNAL MEDICINE | Facility: CLINIC | Age: 62
End: 2022-06-10

## 2022-06-10 ENCOUNTER — OUTSIDE FACILITY SERVICE (OUTPATIENT)
Dept: ORTHOPEDIC SURGERY | Facility: CLINIC | Age: 62
End: 2022-06-10

## 2022-06-10 VITALS
HEART RATE: 108 BPM | SYSTOLIC BLOOD PRESSURE: 116 MMHG | DIASTOLIC BLOOD PRESSURE: 66 MMHG | HEIGHT: 66 IN | BODY MASS INDEX: 24.43 KG/M2 | WEIGHT: 152 LBS | TEMPERATURE: 98.4 F

## 2022-06-10 DIAGNOSIS — F32.1 CURRENT MODERATE EPISODE OF MAJOR DEPRESSIVE DISORDER WITHOUT PRIOR EPISODE: ICD-10-CM

## 2022-06-10 DIAGNOSIS — Z00.00 PREVENTATIVE HEALTH CARE: Primary | ICD-10-CM

## 2022-06-10 DIAGNOSIS — E55.9 VITAMIN D DEFICIENCY: ICD-10-CM

## 2022-06-10 DIAGNOSIS — I10 PRIMARY HYPERTENSION: ICD-10-CM

## 2022-06-10 DIAGNOSIS — M79.7 FIBROMYALGIA: ICD-10-CM

## 2022-06-10 DIAGNOSIS — Z12.31 VISIT FOR SCREENING MAMMOGRAM: ICD-10-CM

## 2022-06-10 DIAGNOSIS — E78.2 MIXED HYPERLIPIDEMIA: ICD-10-CM

## 2022-06-10 DIAGNOSIS — G43.009 MIGRAINE WITHOUT AURA AND WITHOUT STATUS MIGRAINOSUS, NOT INTRACTABLE: ICD-10-CM

## 2022-06-10 PROCEDURE — 77002 NEEDLE LOCALIZATION BY XRAY: CPT | Performed by: ORTHOPAEDIC SURGERY

## 2022-06-10 PROCEDURE — 77067 SCR MAMMO BI INCL CAD: CPT | Performed by: RADIOLOGY

## 2022-06-10 PROCEDURE — 77067 SCR MAMMO BI INCL CAD: CPT

## 2022-06-10 PROCEDURE — 99396 PREV VISIT EST AGE 40-64: CPT | Performed by: INTERNAL MEDICINE

## 2022-06-10 PROCEDURE — 20610 DRAIN/INJ JOINT/BURSA W/O US: CPT | Performed by: ORTHOPAEDIC SURGERY

## 2022-06-10 PROCEDURE — 77063 BREAST TOMOSYNTHESIS BI: CPT

## 2022-06-10 PROCEDURE — 77063 BREAST TOMOSYNTHESIS BI: CPT | Performed by: RADIOLOGY

## 2022-06-10 RX ORDER — BUPROPION HYDROCHLORIDE 300 MG/1
300 TABLET ORAL DAILY
Qty: 30 TABLET | Refills: 5 | Status: SHIPPED | OUTPATIENT
Start: 2022-06-10 | End: 2023-01-12

## 2022-06-10 RX ORDER — BUPROPION HYDROCHLORIDE 150 MG/1
150 TABLET ORAL DAILY
Qty: 30 TABLET | Refills: 5 | Status: SHIPPED | OUTPATIENT
Start: 2022-06-10 | End: 2022-12-05

## 2022-06-10 NOTE — PROGRESS NOTES
[facility]    Operative Report    [facility]  [address], [address]  [place] [address]    DATE OF PROCEDURE: 06/10/22    PREOPERATIVE DIAGNOSIS: right hip arthritis    POSTOPERATIVE DIAGNOSIS:  right hip arthritis    PROCEDURES PERFORMED:   right hip injection under fluoroscopic guidance    SURGEON: Ned Vasquez MD    SPECIMENS: None    ESTIMATED BLOOD LOSS: None    COMPLICATIONS: None    INDICATIONS: The patient is a 62 y.o. female with right hip pain secondary to arthritis that failed to improve in spite of conservative treatment .  Options have been discussed at length with the patient, and patient has reached the point where the patient desires to proceed with an intra-articular hip injection understanding the risks, benefits, and alternatives. Consent was obtained. Please see my office notes for details with regard to preprocedure counseling and rationale.     DESCRIPTION OF PROCEDURE: The patient was positively identified in the preoperative holding area and brought to the operating suite and placed in a supine position.  Timeout procedure was performed to confirm the injection site, as well as other parameters. Following the sterile prep and drape, a 20-gauge spinal needle was placed into the hip joint, and confirmed to be in an intra-articular location with radiographic dye, and subsequently infiltrated with 40 mg of Kenalog mixed with ropivacaine.    The patient tolerated the procedure well and was brought to the recovery room in good condition.  The patient noted 100% improvement walking from the operating room to the recovery room.    PLAN:  1.  The patient will keep a written or mental diary of how well the injection works and for how long.  2.  Follow up in 4 weeks as planned in the office.    Future Appointments   Date Time Provider Department Center   6/10/2022  8:40 AM Ned Vasquez MD [facility]   6/10/2022  2:00  PM [facility] BR SCREENING [facility] BR 60 [facility]   6/10/2022  3:30 PM Don Rodriguez MD [facility] IM [facility]   7/11/2022  1:30 PM Ned Vasquez MD [facility]   7/28/2022  9:00 AM Cora Horan MD [facility] N CT [facility]       Ned Vasquez MD  06/10/22  08:14 EDT

## 2022-06-10 NOTE — PROGRESS NOTES
[facility]     Batool Hampton  1960   [id number]      Patient Care Team:  Don Rodriguez MD as PCP - General (Internal Medicine)  Mk Silver MD as Consulting Physician (Obstetrics and Gynecology)    Chief Complaint::   Chief Complaint   Patient presents with   • Annual Exam        HPI  The patient has consented to being recorded using [facility].    The patient comes in for annual examination and for follow-up of hypertension, hyperlipidemia, vitamin D deficiency, depression, fibromyalgia, and migraine.    Migraine Headache  She states she is doing well on her headache medication, Ubrelvy.     Anxiety  The patient states she is still taking bupropion 150 mg 1 tablet daily and 300 mg 1 tablet daily. The patient states she still has some buspirone, but she started taking it again in the last month. She states she is still on the lamotrigine. She had been taking half of a pill (50 mg); however, 2 months ago she increased back up to 100 mg. She states she is on Ambien. The patient states she feels tired and anxious. The patient states that her sleep pattern depends on her 's, as he is in the terminal stages of ALS, and she is his caregiver.     Osteoarthritis  The patient states she has bone spurs in the right hand. She states she had her first injection today. The patient states Dr. Vasquez was concerned about her hands bruising where her knuckles are. She states she does not have any problems with it unless she puts too much stress on it.  She denies any nosebleeds, bleeding into joints, or vaginal bleeding. The patient is not on aspirin.    Sore throat  The patient states she started feeling like she was getting a sore throat on Friday or Saturday evening, 06/03/2022 or 06/04/2022. By the next morning, she could not swallow due to sore throat pain. She initially thought it was allergies. She eventually presented to   the urgent care center at [facility] where she tested  negative for COVID-19, influenza, and strep. She was started on prednisone and cefdinir; however, her throat pain continued to worsen. She had cracking of her lips and difficulty drinking anything. She started taking a probiotic because she thought she may have thrush. While it has improved, she states it still hurts to sneeze, yawn, and speak loudly such as calling the dog. She denies any rash in her mouth or throat. The patient states her tonsils looked irritated. She states her tongue was white and it still is white. The patient states it had large red bumps on it. She states the top of it was sore.     Health Maintenance  The patient states she had her mammogram today. She saw the gynecologist last month.    Chronic Conditions:      Patient Active Problem List   Diagnosis   • Lumbar spondylosis   • Fibromyalgia   • Depression   • Allergic rhinitis   • Hyperlipidemia   • Primary hypertension   • Migraine   • Gastroesophageal reflux disease without esophagitis   • Vitamin D deficiency   • Current moderate episode of major depressive disorder without prior episode (HCC)        Past Medical History:   Diagnosis Date   • Acute cholecystitis 2016   • Depression    • Deviated nasal septum    • Ectopic pregnancy    • Female infertility    • Fibromyalgia    • HNP (herniated nucleus pulposus), lumbar 2008    L5Si   • Hypertension    • Migraine    • MRSA (methicillin resistant staph aureus) culture positive     Great toe L   • Osteoarthritis    • PONV (postoperative nausea and vomiting)    • PTSD (post-traumatic stress disorder)    • Urinary tract infection Occasionally   • Varicella Baby       Past Surgical History:   Procedure Laterality Date   •  SECTION  To remove growth due to fertility meds.    • KNEE SURGERY Bilateral    • LAPAROSCOPIC CHOLECYSTECTOMY     • NASAL SEPTUM SURGERY     • OOPHORECTOMY Right        Family History   Problem Relation Age of Onset   • Lung cancer  Mother    • Cancer Father         Bladder   • Coronary artery disease Father 61        premature    • Pancreatic cancer Father    • Hypertension Father    • BRCA 1/2 Maternal Grandmother 40   • Breast cancer Maternal Grandmother    • BRCA 1/2 Cousin 23   • Ovarian cancer Neg Hx        Social History     Socioeconomic History   • Marital status:    Tobacco Use   • Smoking status: Never Smoker   • Smokeless tobacco: Never Used   Substance and Sexual Activity   • Alcohol use: Yes     Alcohol/week: 1.0 - 2.0 standard drink     Types: 1 - 2 Glasses of wine per week     Comment: Rarely   • Drug use: Never   • Sexual activity: Not Currently     Partners: Male     Birth control/protection: Post-menopausal       Allergies   Allergen Reactions   • Codeine Unknown (See Comments)     Unknown   • Morphine Unknown (See Comments)     Unknown   • Tetracyclines & Related Unknown (See Comments)     Unknown   • Sulfa Antibiotics Rash     And swelling with skin discoloration         Current Outpatient Medications:   •  acyclovir (Zovirax) 5 % ointment, Apply  topically to the appropriate area as directed Every 3 (Three) Hours., Disp: 15 g, Rfl: 0  •  almotriptan (AXERT) 6.25 MG tablet, Take 2 tablets by mouth 1 (One) Time As Needed for Migraine for up to 30 doses. may repeat in 2 hours if needed, Disp: 10 tablet, Rfl: 5  •  buPROPion XL (WELLBUTRIN XL) 150 MG 24 hr tablet, Take 1 tablet by mouth Daily. Along with 300 mg, Disp: 30 tablet, Rfl: 5  •  buPROPion XL (WELLBUTRIN XL) 300 MG 24 hr tablet, Take 1 tablet by mouth Daily., Disp: 30 tablet, Rfl: 5  •  busPIRone (BUSPAR) 15 MG tablet, Take one tablet twice a day (Patient taking differently: Take 15 mg by mouth 2 (Two) Times a Day As Needed.), Disp: 60 tablet, Rfl: 5  •  Dexilant 60 MG capsule, TAKE ONE CAPSULE ONCE DAILY, Disp: 30 capsule, Rfl: 5  •  diclofenac (VOLTAREN) 1 % gel gel, Apply 1 g topically to the appropriate area as directed Daily., Disp: , Rfl:   •  DULoxetine  (CYMBALTA) 60 MG capsule, TAKE ONE CAPSULE ONCE DAILY, Disp: 30 capsule, Rfl: 5  •  estradiol (Vivelle-Dot) 0.05 MG/24HR patch, Place 1 patch on the skin as directed by provider 2 (Two) Times a Week for 364 days., Disp: 8 patch, Rfl: 12  •  hydroquinone 4 % cream, MIX WITH MOISTURIZER AND APPLY TO FACE TWICE DAILY FOR 12 WEEKS, Disp: , Rfl:   •  imipramine (TOFRANIL) 10 MG tablet, Take 10-30 mg by mouth Every Night., Disp: , Rfl:   •  lamoTRIgine (LaMICtal) 100 MG tablet, Take 100 mg by mouth Daily., Disp: , Rfl:   •  levocetirizine (XYZAL) 5 MG tablet, Take 1 tablet by mouth Daily., Disp: , Rfl:   •  lisinopril (PRINIVIL,ZESTRIL) 10 MG tablet, TAKE ONE TABLET BY MOUTH ONCE DAILY, Disp: 30 tablet, Rfl: 5  •  nystatin-triamcinolone (MYCOLOG) 130752-3.1 UNIT/GM-% ointment, Apply  topically to the appropriate area as directed 2 (Two) Times a Week., Disp: 60 g, Rfl: 1  •  ondansetron (ZOFRAN) 4 MG tablet, Take 4 mg by mouth Every 8 (Eight) Hours As Needed., Disp: , Rfl:   •  Progesterone (PROMETRIUM) 200 MG capsule, Take 1 capsule by mouth Daily., Disp: 30 capsule, Rfl: 12  •  tretinoin (RETIN-A) 0.05 % cream, APPLY TOPICALLY TO FACE EVERY NIGHT AT BEDTIME, Disp: , Rfl:   •  triamterene-hydrochlorothiazide (DYAZIDE) 37.5-25 MG per capsule, TAKE ONE CAPSULE ONCE DAILY, Disp: 30 capsule, Rfl: 5  •  valACYclovir (Valtrex) 500 MG tablet, Take 1 tablet by mouth 2 (Two) Times a Day. (Patient taking differently: Take 500 mg by mouth 2 (Two) Times a Day As Needed.), Disp: 10 tablet, Rfl: 0  •  zolpidem (AMBIEN) 5 MG tablet, TAKE 1-2 TABLETS BY MOUTH AT BEDTIME, Disp: 60 tablet, Rfl: 2  •  nystatin (MYCOSTATIN) 100,000 unit/mL suspension, Swish and swallow 5 mL 4 (Four) Times a Day., Disp: 60 mL, Rfl: 0    Immunization History   Administered Date(s) Administered   • COVID-19 (PFIZER) PURPLE CAP 03/05/2021, 03/26/2021, 12/13/2021   • Flu Vaccine Quad PF >36MO 10/01/2021   • FluLaval/Fluarix/Fluzone >6 10/21/2020   • Fluzone Split  "Quad (Multi-dose) 10/20/2016, 12/07/2017, 12/04/2018   • INFLUENZA SPLIT TRI 02/07/2013, 09/12/2013   • Influenza TIV (IM) 09/03/2010, 10/12/2011, 11/25/2014   • Tdap 11/25/2014   • Zostavax 12/05/2018   • flucelvax quad pfs =>4 YRS 01/17/2020        Health Maintenance Due   Topic Date Due   • ZOSTER VACCINE (2 of 3) 01/30/2019   • HEPATITIS C SCREENING  Never done   • LIPID PANEL  02/25/2022   • ANNUAL PHYSICAL  02/26/2022   • COVID-19 Vaccine (4 - Booster for Pfizer series) 04/13/2022        Review of Systems   Constitutional: Negative for chills, fatigue and fever.   HENT: Negative for congestion, ear pain and sinus pressure.    Respiratory: Negative for cough, chest tightness, shortness of breath and wheezing.    Cardiovascular: Negative for chest pain and palpitations.   Gastrointestinal: Negative for abdominal pain, blood in stool and constipation.   Skin: Negative for color change.   Allergic/Immunologic: Negative for environmental allergies.   Neurological: Negative for dizziness, speech difficulty and headache.   Psychiatric/Behavioral: Negative for decreased concentration. The patient is not nervous/anxious.         Vital Signs  Vitals:    06/10/22 1518   BP: 116/66   BP Location: Left arm   Patient Position: Sitting   Cuff Size: Adult   Pulse: 108   Temp: 98.4 °F (36.9 °C)   TempSrc: Temporal   Weight: 68.9 kg (152 lb)   Height: 167.6 cm (65.98\")   PainSc: 0-No pain       Physical Exam  Vitals reviewed.   Constitutional:       Appearance: She is well-developed.   HENT:      Head: Normocephalic and atraumatic.   Cardiovascular:      Rate and Rhythm: Normal rate and regular rhythm.      Heart sounds: Normal heart sounds. No murmur heard.  Pulmonary:      Effort: Pulmonary effort is normal.      Breath sounds: Normal breath sounds.   Neurological:      Mental Status: She is alert and oriented to person, place, and time.          Procedures     Fall Risk Screen:  STEADI Fall Risk Assessment has not been " completed.    Health Habits and Functional and Cognitive Screening:  No flowsheet data found.    Depression [place]ing  PHQ-9 Total Score: 15     ACE III MINI             Assessment/Plan:    Diagnoses and all orders for this visit:    1. Preventative health care (Primary)       - Given the fact that her  is in the terminal stages of ALS, she is holding up well. She is fully vaccinated against COVID-19 and up to date on mammography and colorectal cancer screening.    2. Primary hypertension  -     CBC & Differential; Future  -     Comprehensive Metabolic Panel; Future  - Blood pressure is well controlled on lisinopril.    3. Mixed hyperlipidemia  -     Lipid Panel; Future  - Lipid panel is pending. She will continue a healthy diet.    4. Vitamin D deficiency  -     Vitamin D 25 Hydroxy; Future  - Vitamin D level is pending. She will continue taking over-the-counter vitamin D.    5. Current moderate episode of major depressive disorder without prior episode (HCC)       - Mood is remarkably well controlled on bupropion, duloxetine, imipramine, and lamotrigine.    6. Fibromyalgia       - This is currently reasonably well controlled with regular physical activity.    7. Migraine without aura and without status migrainosus, not intractable       - Migraines are currently infrequent. She continues using Ubrelvy as needed.    8. Pharyngitis       - Possibly thrush. Her tongue is geographic, although certainly not classic for thrush, but she has had antibiotics and steroids. She is empirically given nystatin oral solution, but if that does not resolve, she will need to see ENT.    Other orders  -     buPROPion XL (WELLBUTRIN XL) 150 MG 24 hr tablet; Take 1 tablet by mouth Daily. Along with 300 mg  Dispense: 30 tablet; Refill: 5  -     buPROPion XL (WELLBUTRIN XL) 300 MG 24 hr tablet; Take 1 tablet by mouth Daily.  Dispense: 30 tablet; Refill: 5  -     nystatin (MYCOSTATIN) 100,000 unit/mL suspension; Swish and swallow 5  mL 4 (Four) Times a Day.  Dispense: 60 mL; Refill: 0    BMI is within normal parameters. No other follow-up for BMI required.      Labs  Results for orders placed or performed in visit on 22   LIQUID-BASED PAP SMEAR, [facility] ([facility])    Specimen: ThinPrep Vial   Result Value Ref Range    Reference Lab Report       [facility]  [address] [place] [address]  Phone: [phone] or [phone]  Fax: [phone]  Pierce Dowell M.D., Medical Director    PATIENT NAME                                     LABORATORY NO.  [id number]   MERA PAZ                               [id number]  [id number]                                 AGE                    SEX   SSN              CLIENT REF #  [facility]                                 62        1960      F     [id number]      [id number]    [address]                 REQUESTING MLEILA.           ATTENDING [place] [address]                        LAZARO MOTA  DATE COLLECTED            DATE RECEIVED          DATE REPORTED  2022    ThinPrep Pap with [facility]    DIAGNOSIS:  Negative for intraepithelial lesion or malignancy    Multiple factors can influence accuracy of Pap tests; therefore, screening at  regular  intervals is necessary for early cancer detection.      SPECIMEN ADEQUACY:  SATISFACTORY FOR EVALUATION  Transformation zone is present.  SOURCE OF SPECIMEN:       CERVICAL  SLIDES:  1  CLINICAL HISTORY:  Women's annual routine gynecological examination  Post Menopausal    HPV  HR-HPV POOL: Negative    The Aptima HPV assay is an in vitro nucleic acid amplification test for the  qualitative detection of E6/E7 viral messenger RNA from 14 high risk types of  HPV in cervical specimens. The high risk HPV types detected include: 16, 18,  31, 33, 35, 39, 45, 51, 52, 56, 58, 59, 66, 68    CYTOTECHNOLOGIST:             NATALIE,  CT (ASCP)    CPT CODES:  30164, 26091          Counseling was given to patient for the following topics: appropriate exercise 150 minutes per week, disease prevention and healthy eating habits.    Plan of care reviewed with patient at the conclusion of today's visit. Education was provided regarding diagnosis, management, and any prescribed or recommended OTC medications.Patient verbalizes understanding of and agreement with management plan.       Don Rodriguez MD       Transcribed from ambient dictation for Don Rodriguez MD by Kerry Koch.  06/10/22   17:01 EDT    Patient verbalized consent to the visit recording.

## 2022-06-17 LAB
25(OH)D3+25(OH)D2 SERPL-MCNC: 33.1 NG/ML (ref 30–100)
ALBUMIN SERPL-MCNC: 4.4 G/DL (ref 3.8–4.8)
ALBUMIN/GLOB SERPL: 1.8 {RATIO} (ref 1.2–2.2)
ALP SERPL-CCNC: 125 IU/L (ref 44–121)
ALT SERPL-CCNC: 31 IU/L (ref 0–32)
AMBIG ABBREV CMP14 DEFAULT: NORMAL
AMBIG ABBREV LP DEFAULT: NORMAL
AST SERPL-CCNC: 26 IU/L (ref 0–40)
BASOPHILS # BLD AUTO: 0.1 X10E3/UL (ref 0–0.2)
BASOPHILS NFR BLD AUTO: 1 %
BILIRUB SERPL-MCNC: 0.6 MG/DL (ref 0–1.2)
BUN SERPL-MCNC: 21 MG/DL (ref 8–27)
BUN/CREAT SERPL: 16 (ref 12–28)
CALCIUM SERPL-MCNC: 9.4 MG/DL (ref 8.7–10.3)
CHLORIDE SERPL-SCNC: 101 MMOL/L (ref 96–106)
CHOLEST SERPL-MCNC: 266 MG/DL (ref 100–199)
CO2 SERPL-SCNC: 23 MMOL/L (ref 20–29)
CREAT SERPL-MCNC: 1.31 MG/DL (ref 0.57–1)
EGFRCR SERPLBLD CKD-EPI 2021: 46 ML/MIN/1.73
EOSINOPHIL # BLD AUTO: 0.2 X10E3/UL (ref 0–0.4)
EOSINOPHIL NFR BLD AUTO: 2 %
ERYTHROCYTE [DISTWIDTH] IN BLOOD BY AUTOMATED COUNT: 11.9 % (ref 11.7–15.4)
GLOBULIN SER CALC-MCNC: 2.5 G/DL (ref 1.5–4.5)
GLUCOSE SERPL-MCNC: 94 MG/DL (ref 65–99)
HCT VFR BLD AUTO: 42.1 % (ref 34–46.6)
HDLC SERPL-MCNC: 68 MG/DL
HGB BLD-MCNC: 14.2 G/DL (ref 11.1–15.9)
IMM GRANULOCYTES # BLD AUTO: 0 X10E3/UL (ref 0–0.1)
IMM GRANULOCYTES NFR BLD AUTO: 0 %
LDLC SERPL CALC-MCNC: 184 MG/DL (ref 0–99)
LDLC/HDLC SERPL: 2.7 RATIO (ref 0–3.2)
LYMPHOCYTES # BLD AUTO: 2.3 X10E3/UL (ref 0.7–3.1)
LYMPHOCYTES NFR BLD AUTO: 26 %
MCH RBC QN AUTO: 30.7 PG (ref 26.6–33)
MCHC RBC AUTO-ENTMCNC: 33.7 G/DL (ref 31.5–35.7)
MCV RBC AUTO: 91 FL (ref 79–97)
MONOCYTES # BLD AUTO: 0.7 X10E3/UL (ref 0.1–0.9)
MONOCYTES NFR BLD AUTO: 8 %
NEUTROPHILS # BLD AUTO: 5.5 X10E3/UL (ref 1.4–7)
NEUTROPHILS NFR BLD AUTO: 63 %
PLATELET # BLD AUTO: 294 X10E3/UL (ref 150–450)
POTASSIUM SERPL-SCNC: 3.8 MMOL/L (ref 3.5–5.2)
PROT SERPL-MCNC: 6.9 G/DL (ref 6–8.5)
RBC # BLD AUTO: 4.63 X10E6/UL (ref 3.77–5.28)
SODIUM SERPL-SCNC: 142 MMOL/L (ref 134–144)
TRIGL SERPL-MCNC: 85 MG/DL (ref 0–149)
VLDLC SERPL CALC-MCNC: 14 MG/DL (ref 5–40)
WBC # BLD AUTO: 8.9 X10E3/UL (ref 3.4–10.8)

## 2022-06-23 DIAGNOSIS — G47.09 OTHER INSOMNIA: ICD-10-CM

## 2022-06-23 RX ORDER — ZOLPIDEM TARTRATE 5 MG/1
TABLET ORAL
Qty: 60 TABLET | Refills: 2 | Status: SHIPPED | OUTPATIENT
Start: 2022-06-23

## 2022-07-15 ENCOUNTER — TELEPHONE (OUTPATIENT)
Dept: ORTHOPEDIC SURGERY | Facility: CLINIC | Age: 62
End: 2022-07-15

## 2022-07-18 ENCOUNTER — TELEPHONE (OUTPATIENT)
Dept: ORTHOPEDIC SURGERY | Facility: CLINIC | Age: 62
End: 2022-07-18

## 2022-07-20 RX ORDER — METHYLPREDNISOLONE 4 MG/1
TABLET ORAL
Qty: 21 TABLET | Refills: 0 | Status: SHIPPED | OUTPATIENT
Start: 2022-07-20 | End: 2022-09-28

## 2022-07-28 ENCOUNTER — OFFICE VISIT (OUTPATIENT)
Dept: NEUROLOGY | Facility: CLINIC | Age: 62
End: 2022-07-28

## 2022-07-28 VITALS
WEIGHT: 151 LBS | BODY MASS INDEX: 24.38 KG/M2 | DIASTOLIC BLOOD PRESSURE: 70 MMHG | OXYGEN SATURATION: 96 % | SYSTOLIC BLOOD PRESSURE: 116 MMHG | HEART RATE: 91 BPM

## 2022-07-28 DIAGNOSIS — G43.009 MIGRAINE WITHOUT AURA AND WITHOUT STATUS MIGRAINOSUS, NOT INTRACTABLE: Primary | ICD-10-CM

## 2022-07-28 DIAGNOSIS — M54.17 LUMBOSACRAL RADICULOPATHY: ICD-10-CM

## 2022-07-28 PROCEDURE — 99204 OFFICE O/P NEW MOD 45 MIN: CPT | Performed by: PSYCHIATRY & NEUROLOGY
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
## 2022-08-08 ENCOUNTER — TELEPHONE (OUTPATIENT)
Dept: NEUROLOGY | Facility: CLINIC | Age: 62
End: 2022-08-08

## 2022-08-08 NOTE — TELEPHONE ENCOUNTER
Provider: MURALI  Caller: PATIENT  Relationship to Patient: SELF  Pharmacy: N/A  Phone Number: [phone]  Reason for Call:PATIENT TEL RE: STATUS OF HER MRI REFERRAL AS SHE HAS NOT HEARD ANYTHING YET.    PLEASE REVIEW & ADVISE.    THANK YOU.

## 2022-08-09 NOTE — TELEPHONE ENCOUNTER
Called and informed Pt we are just waiting on PA to come back from insurance and then we will get her scheduled.

## 2022-08-18 DIAGNOSIS — Z12.11 SCREENING FOR COLORECTAL CANCER: Primary | ICD-10-CM

## 2022-08-18 DIAGNOSIS — Z12.12 SCREENING FOR COLORECTAL CANCER: Primary | ICD-10-CM

## 2022-09-06 RX ORDER — VALACYCLOVIR HYDROCHLORIDE 500 MG/1
500 TABLET, FILM COATED ORAL 2 TIMES DAILY
Qty: 10 TABLET | Refills: 2 | Status: SHIPPED | OUTPATIENT
Start: 2022-09-06 | End: 2022-12-28 | Stop reason: SDUPTHER

## 2022-09-06 RX ORDER — ACYCLOVIR 50 MG/G
OINTMENT TOPICAL
Qty: 15 G | Refills: 0 | Status: SHIPPED | OUTPATIENT
Start: 2022-09-06 | End: 2022-12-28 | Stop reason: SDUPTHER

## 2022-09-08 RX ORDER — DEXLANSOPRAZOLE 60 MG/1
1 CAPSULE, DELAYED RELEASE ORAL DAILY
Qty: 30 CAPSULE | Refills: 5 | Status: SHIPPED | OUTPATIENT
Start: 2022-09-08 | End: 2023-03-01 | Stop reason: SDUPTHER

## 2022-09-08 RX ORDER — LISINOPRIL 10 MG/1
10 TABLET ORAL DAILY
Qty: 30 TABLET | Refills: 5 | Status: SHIPPED | OUTPATIENT
Start: 2022-09-08 | End: 2023-03-01 | Stop reason: SDUPTHER

## 2022-09-08 RX ORDER — LISINOPRIL 10 MG/1
TABLET ORAL
Qty: 30 TABLET | Refills: 5 | Status: SHIPPED | OUTPATIENT
Start: 2022-09-08 | End: 2022-09-08

## 2022-09-08 RX ORDER — DULOXETIN HYDROCHLORIDE 60 MG/1
60 CAPSULE, DELAYED RELEASE ORAL DAILY
Qty: 30 CAPSULE | Refills: 5 | Status: SHIPPED | OUTPATIENT
Start: 2022-09-08 | End: 2023-03-01 | Stop reason: SDUPTHER

## 2022-09-08 RX ORDER — TRIAMTERENE AND HYDROCHLOROTHIAZIDE 37.5; 25 MG/1; MG/1
1 CAPSULE ORAL DAILY
Qty: 30 CAPSULE | Refills: 5 | Status: SHIPPED | OUTPATIENT
Start: 2022-09-08 | End: 2023-03-01 | Stop reason: SDUPTHER

## 2022-09-12 ENCOUNTER — TELEPHONE (OUTPATIENT)
Dept: NEUROLOGY | Facility: CLINIC | Age: 62
End: 2022-09-12

## 2022-09-12 NOTE — TELEPHONE ENCOUNTER
Called patient left a VM letting her know Dr. Kimbrough's message about MRI results and to bring her MRI results disc for next joan. Also if she wanted  to have Physical Therapy to let us know  so Dr. Horan can send the referral. If further questions she can give our office a call.

## 2022-09-12 NOTE — TELEPHONE ENCOUNTER
----- Message from Cora Horan MD sent at 9/7/2022  9:07 AM EDT -----  Please tell the patient that I received the report of her MRI lumbar spine and it shows multilevel changes consistent with arthritis with mild irritation of the nerves on both sides with the right side being worse.  I do not think she needs surgery for any of these changes.  Does she want a physical therapy referral?  Also tell her to bring her MRI lumbar spine disc at her next appointment

## 2022-09-22 ENCOUNTER — OFFICE VISIT (OUTPATIENT)
Dept: NEUROLOGY | Facility: CLINIC | Age: 62
End: 2022-09-22

## 2022-09-22 VITALS
OXYGEN SATURATION: 97 % | BODY MASS INDEX: 25.35 KG/M2 | HEART RATE: 95 BPM | SYSTOLIC BLOOD PRESSURE: 136 MMHG | DIASTOLIC BLOOD PRESSURE: 70 MMHG | WEIGHT: 157 LBS

## 2022-09-22 DIAGNOSIS — G43.009 MIGRAINE WITHOUT AURA AND WITHOUT STATUS MIGRAINOSUS, NOT INTRACTABLE: ICD-10-CM

## 2022-09-22 DIAGNOSIS — M54.17 LUMBOSACRAL RADICULOPATHY: Primary | ICD-10-CM

## 2022-09-22 PROCEDURE — 99214 OFFICE O/P EST MOD 30 MIN: CPT | Performed by: PSYCHIATRY & NEUROLOGY
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
## 2022-09-28 ENCOUNTER — OFFICE VISIT (OUTPATIENT)
Dept: ORTHOPEDIC SURGERY | Facility: CLINIC | Age: 62
End: 2022-09-28

## 2022-09-28 VITALS
DIASTOLIC BLOOD PRESSURE: 82 MMHG | HEIGHT: 66 IN | SYSTOLIC BLOOD PRESSURE: 124 MMHG | WEIGHT: 157 LBS | BODY MASS INDEX: 25.23 KG/M2

## 2022-09-28 DIAGNOSIS — M16.11 PRIMARY OSTEOARTHRITIS OF RIGHT HIP: Primary | ICD-10-CM

## 2022-09-28 PROCEDURE — 99212 OFFICE O/P EST SF 10 MIN: CPT | Performed by: ORTHOPAEDIC SURGERY

## 2022-09-28 NOTE — PROGRESS NOTES
[facility] Orthopaedic Surgery Clinic Note    Subjective     Chief Complaint   Patient presents with   • Follow-up     Primary osteoarthritis of right hip after intraarticular cortisone injection 6/10/22        HPI    It has been 4.5  month(s) since Ms. Hampton's last visit. She returns to clinic today for follow-up of right hip arthritis. The issue has been ongoing for 2 year(s). She rates her pain a 0/10 on the pain scale. Previous/current treatments: steroid injection (last injection 6/10/22). Current symptoms: stiffness. The pain is worse with walking, standing, sitting, climbing stairs, sleeping, working, leisure and rising from seated position; resting improve the pain. Overall, she is doing better.  Injection helped out significantly.    I have reviewed the following portions of the patient's history and agree with: History of Present Illness and Review of Systems    Patient Active Problem List   Diagnosis   • Lumbar spondylosis   • Fibromyalgia   • Depression   • Allergic rhinitis   • Hyperlipidemia   • Primary hypertension   • Migraine   • Gastroesophageal reflux disease without esophagitis   • Vitamin D deficiency   • Current moderate episode of major depressive disorder without prior episode (HCC)     Past Medical History:   Diagnosis Date   • Acute cholecystitis 11/2016   • Cluster headache Early childhood   • CTS (carpal tunnel syndrome) 2021   • Depression    • Deviated nasal septum    • Ectopic pregnancy 2007   • Female infertility    • Fibromyalgia    • Fibromyalgia, primary 1998   • Headache, tension-type     Not sure but if head hurts and it’s not a migraine I assume its tension   • HNP (herniated nucleus pulposus), lumbar 2008    L5Si   • Hyperlipidemia     Its getting higher but never been on Cholesterol meds   • Hypertension    • Migraine    • MRSA (methicillin resistant staph aureus) culture positive 2008    Great toe L   • Osteoarthritis    • PONV (postoperative nausea and vomiting)    • PTSD  (post-traumatic stress disorder)    • Shingles Uncertain    Had 1st vaccine and had severe reaction   • Urinary tract infection Occasionally   • Varicella Baby      Past Surgical History:   Procedure Laterality Date   •  SECTION  To remove growth due to fertility meds.    • KNEE SURGERY Bilateral    • LAPAROSCOPIC CHOLECYSTECTOMY     • NASAL SEPTUM SURGERY  2010   • OOPHORECTOMY Right       Family History   Problem Relation Age of Onset   • Lung cancer Mother    • Cancer Father         Bladder   • Coronary artery disease Father 61        premature    • Pancreatic cancer Father    • Hypertension Father    • Migraines Father    • BRCA 1/2 Maternal Grandmother 40   • Breast cancer Maternal Grandmother    • Migraines Maternal Grandmother    • BRCA 1/2 Cousin 23   • Migraines Paternal Aunt    • Seizures Brother          at 11 years old. Ill from birth   • Stroke Maternal Aunt         1st stroke approximately at 86 and lived to be 99.   • Ovarian cancer Neg Hx      Social History     Socioeconomic History   • Marital status:    Tobacco Use   • Smoking status: Never Smoker   • Smokeless tobacco: Never Used   Substance and Sexual Activity   • Alcohol use: Yes     Alcohol/week: 1.0 - 2.0 standard drink     Types: 1 - 2 Glasses of wine per week     Comment: Rarely   • Drug use: Never   • Sexual activity: Not Currently     Partners: Male     Birth control/protection: Post-menopausal      Current Outpatient Medications on File Prior to Visit   Medication Sig Dispense Refill   • acyclovir (Zovirax) 5 % ointment Apply  topically to the appropriate area as directed Every 3 (Three) Hours. 15 g 0   • almotriptan (AXERT) 6.25 MG tablet Take 2 tablets by mouth 1 (One) Time As Needed for Migraine for up to 30 doses. may repeat in 2 hours if needed 10 tablet 5   • buPROPion XL (WELLBUTRIN XL) 150 MG 24 hr tablet Take 1 tablet by mouth Daily. Along with 300 mg 30 tablet 5   • buPROPion XL (WELLBUTRIN  XL) 300 MG 24 hr tablet Take 1 tablet by mouth Daily. 30 tablet 5   • busPIRone (BUSPAR) 15 MG tablet Take one tablet twice a day (Patient taking differently: Take 15 mg by mouth 2 (Two) Times a Day As Needed.) 60 tablet 5   • dexlansoprazole (Dexilant) 60 MG capsule Take 1 capsule by mouth Daily. 30 capsule 5   • diclofenac (VOLTAREN) 1 % gel gel Apply 1 g topically to the appropriate area as directed Daily.     • DULoxetine (CYMBALTA) 60 MG capsule Take 1 capsule by mouth Daily. 30 capsule 5   • estradiol (Vivelle-Dot) 0.05 MG/24HR patch Place 1 patch on the skin as directed by provider 2 (Two) Times a Week for 364 days. 8 patch 12   • hydroquinone 4 % cream MIX WITH MOISTURIZER AND APPLY TO FACE TWICE DAILY FOR 12 WEEKS     • imipramine (TOFRANIL) 10 MG tablet Take 10-30 mg by mouth Every Night.     • lamoTRIgine (LaMICtal) 100 MG tablet Take 100 mg by mouth Daily.     • levocetirizine (XYZAL) 5 MG tablet Take 1 tablet by mouth Daily.     • lisinopril (PRINIVIL,ZESTRIL) 10 MG tablet Take 1 tablet by mouth Daily. 30 tablet 5   • nystatin (MYCOSTATIN) 100,000 unit/mL suspension Swish and swallow 5 mL 4 (Four) Times a Day. 60 mL 0   • nystatin-triamcinolone (MYCOLOG) 629031-9.1 UNIT/GM-% ointment Apply  topically to the appropriate area as directed 2 (Two) Times a Week. 60 g 1   • ondansetron (ZOFRAN) 4 MG tablet Take 4 mg by mouth Every 8 (Eight) Hours As Needed.     • Progesterone (PROMETRIUM) 200 MG capsule Take 1 capsule by mouth Daily. 30 capsule 12   • tretinoin (RETIN-A) 0.05 % cream APPLY TOPICALLY TO FACE EVERY NIGHT AT BEDTIME     • triamterene-hydrochlorothiazide (DYAZIDE) 37.5-25 MG per capsule Take 1 capsule by mouth Daily. 30 capsule 5   • valACYclovir (Valtrex) 500 MG tablet Take 1 tablet by mouth 2 (Two) Times a Day. 10 tablet 2   • zolpidem (AMBIEN) 5 MG tablet TAKE 1-2 TABLETS BY MOUTH AT BEDTIME 60 tablet 2   • [DISCONTINUED] methylPREDNISolone (MEDROL) 4 MG dose pack Take as directed on package  instructions. 21 tablet 0     No current facility-administered medications on file prior to visit.      Allergies   Allergen Reactions   • Codeine Unknown (See Comments)     Unknown   • Morphine Unknown (See Comments)     Unknown   • Tetracyclines & Related Unknown (See Comments)     Unknown   • Sulfa Antibiotics Rash     And swelling with skin discoloration        Review of Systems   Constitutional: Positive for fatigue. Negative for activity change, appetite change, chills, diaphoresis, fever and unexpected weight change.   HENT: Positive for sinus pressure. Negative for congestion, dental problem, drooling, ear discharge, ear pain, facial swelling, hearing loss, mouth sores, nosebleeds, postnasal drip, rhinorrhea, sneezing, sore throat, tinnitus, trouble swallowing and voice change.    Eyes: Negative for photophobia, pain, discharge, redness, itching and visual disturbance.   Respiratory: Negative for apnea, cough, choking, chest tightness, shortness of breath, wheezing and stridor.    Cardiovascular: Negative for chest pain, palpitations and leg swelling.   Gastrointestinal: Negative for abdominal distention, abdominal pain, anal bleeding, blood in stool, constipation, diarrhea, nausea, rectal pain and vomiting.   Endocrine: Negative for cold intolerance, heat intolerance, polydipsia, polyphagia and polyuria.   Genitourinary: Negative for decreased urine volume, difficulty urinating, dysuria, enuresis, flank pain, frequency, genital sores, hematuria and urgency.   Musculoskeletal: Positive for arthralgias, back pain, joint swelling, neck pain and neck stiffness. Negative for gait problem and myalgias.   Skin: Negative for color change, pallor, rash and wound.   Allergic/Immunologic: Positive for environmental allergies. Negative for food allergies and immunocompromised state.   Neurological: Positive for weakness, numbness and headaches. Negative for dizziness, tremors, seizures, syncope, facial asymmetry,  "speech difficulty and light-headedness.   Hematological: Negative for adenopathy. Does not bruise/bleed easily.   Psychiatric/Behavioral: Positive for sleep disturbance. Negative for agitation, behavioral problems, confusion, decreased concentration, dysphoric mood, hallucinations, self-injury and suicidal ideas. The patient is not nervous/anxious and is not hyperactive.         Objective      Physical Exam  /82   Ht 167.6 cm (65.98\")   Wt 71.2 kg (157 lb)   BMI 25.35 kg/m²     Body mass index is 25.35 kg/m².    General:   Mental Status:  Alert   Appearance: Cooperative, in no acute distress   Build and Nutrition: Well-nourished female   Orientation: Alert and oriented to person, place and time   Posture: Normal   Gait: Normal    Lower Extremity:   Right Hip:    Tenderness:  None    Swelling:  None    Crepitus:  None    Range of motion:  External Rotation: 40°       Internal Rotation: 40°       Flexion:  100°       Extension:  0°    Deformities:  None  [facility]    No leg length discrepancy      Imaging/Studies  Imaging Results (Last 24 Hours)     ** No results found for the last 24 hours. **        No new imaging today.    Assessment and Plan     Diagnoses and all orders for this visit:    1. Primary osteoarthritis of right hip (Primary)        1. Primary osteoarthritis of right hip        I reviewed my findings with the patient.  Right hip is doing well, with improvement following the injection.  I will see her back in 6 months with an x-ray, but sooner for any problems.    Return in about 6 months (around 3/28/2023) for Recheck with X-Rays.      Ned Vasquez MD  09/28/22  15:13 EDT    "

## 2022-10-25 ENCOUNTER — TELEPHONE (OUTPATIENT)
Dept: NEUROLOGY | Facility: CLINIC | Age: 62
End: 2022-10-25

## 2022-10-25 NOTE — TELEPHONE ENCOUNTER
PATIENT CALLING TO ADVISE SHE HAS BEEN USING SAMPLES OF [employer].    IT IS WORKING GREAT FOR HER.    SHE WAS TOLD TO CALL IN AND REQUEST A SCRIPT FOR IT IF IT WORKED OUT.     }  Medication requested (name and dose):     NURTEC (TAKING OFFICE SAMPLES DONT KNOW MG)      Pharmacy where request should be sent:     [facility] [address] - [phone]  - [phone] FX    Additional details provided by patient: SEE ABOVE    Best call back number: [phone]    Does the patient have less than a 3 day supply:  [] Yes  [x] No    Shine Bradshaw Rep  10/25/22, 13:39 EDT

## 2022-10-26 ENCOUNTER — PRIOR AUTHORIZATION (OUTPATIENT)
Dept: NEUROLOGY | Facility: CLINIC | Age: 62
End: 2022-10-26

## 2022-10-26 RX ORDER — RIMEGEPANT SULFATE 75 MG/75MG
1 TABLET, ORALLY DISINTEGRATING ORAL AS NEEDED
Qty: 8 TABLET | Refills: 3 | Status: SHIPPED | OUTPATIENT
Start: 2022-10-26

## 2022-10-26 NOTE — TELEPHONE ENCOUNTER
LVM that [facility]. Patient can call if not approved and script can be sent to a specialty pharmacy in [place].

## 2022-10-26 NOTE — TELEPHONE ENCOUNTER
Binu Nguyen 61  INPATIENT OCCUPATIONAL THERAPY  [facility] CCU-STEPDOWN 3B  EVALUATION    Time:    Time In: 1320  Time Out: 1353  Timed Code Treatment Minutes: 18 Minutes  Minutes: 33          Date: 11/3/2020  Patient Name: Bebeto Burgess,   Gender: male      MRN: [id number]  : 1952  (80 y.o.)  Referring Practitioner: Dr. Charly Cabello  Diagnosis: unable angina  Additional Pertinent Hx: Per ER note on 2020:68 y.o. male with pmhx of CAD with multiple stents, long cardiac hx, GERD, HLD, HTN, MI, Myocardial bridge, who presented to [address] [place] [address] with c/o chest pain. Patient has had chest pain for years, and has 3/10 pain at baseline but will have exacerbations. He has had this for last 2-4 months and now has worsening pain this morning. Patient has been taking his nitro multiple times without persistent relief. He was supposed to see Dr. Didier Valle on  but could not wait and went to the ER. Patient also had conversational dyspnea, tiredness. His work up was mostly neg at that facility with a normal trop. Patient was started on heparin and nitroglycerin drips and sent to our facility for further management. Still has pain, which is squeezing at baseline but will get intermittently sharp pain, that last a few secs and improves. Restrictions/Precautions:  Restrictions/Precautions: Fall Risk    Subjective  Chart Reviewed: Yes, Orders, Progress Notes, History and Physical  Patient assessed for rehabilitation services?: Yes  Family / Caregiver Present: No    Subjective: Nurse okayed session, had stress test this am. Pt very talkative & tangential in conversation.      Pain:  Pain Assessment  Patient Currently in Pain: Yes(reports chronic chest pain)  Pain Level: 3    Social/Functional History:  Lives With: Spouse  Type of Home: House  Home Layout: One level  Home Access: (2-4)           ADL Assistance: Independent  Homemaking Assistance: Independent  Ambulation Assistance: Independent  Transfer Called patient and left a VM letting her know that a PA has been sent for [facility]   Assistance: Independent       Occupation: Retired  Additional Comments: Indep PLOF without AD. Pt reports very active PLOF. Cognition/Orientation:     Overall Cognitive Status: WFL    ADL's:  LE Dressing: Supervision       Functional Mobility:  Bed mobility  Supine to Sit: Modified independent    Functional Mobility  Functional - Mobility Device: No device  Activity: Other(80ft in hallway)  Assist Level: Stand by assistance  Functional Mobility Comments: steady without LOB   **Pt reports orthostatic BP issues at times & demo good understanding of taking time with position changes    Balance:  Balance  Sitting Balance: Independent  Standing Balance: Stand by assistance    Transfers:  Sit to stand: Stand by assistance  Stand to sit: Stand by assistance       Upper Extremity Assessment:   LUE AROM : WFL  RUE AROM : WFL    LUE Strength  Gross LUE Strength: WFL  RUE Strength  Gross RUE Strength: WFL    Sensation  Overall Sensation Status: WFL       Activity Tolerance: Patient Tolerated treatment well       Assessment:  Assessment: Pt demo mildly decreased ADL & functional mobility status over PLOF. COntinued OT recommended to educate Pt on safety & adaptative strategies for returning home. Performance deficits / Impairments: Decreased functional mobility , Decreased ADL status, Decreased safe awareness, Decreased balance  Prognosis: Good  REQUIRES OT FOLLOW UP: Yes  Decision Making: Low Complexity  Safety Devices in place: Yes  Type of devices: All fall risk precautions in place, Call light within reach    Treatment Initiated: Treatment and education initiated within context of evaluation. Evaluation time included review of current medical information, gathering information related to past medical, social and functional history, completion of standardized testing, formal and informal observation of tasks, assessment of data and development of plan of care and goals.   Treatment time included skilled education and

## 2022-11-04 ENCOUNTER — PRIOR AUTHORIZATION (OUTPATIENT)
Dept: NEUROLOGY | Facility: CLINIC | Age: 62
End: 2022-11-04

## 2022-11-15 ENCOUNTER — TRANSCRIBE ORDERS (OUTPATIENT)
Dept: PHYSICAL THERAPY | Facility: CLINIC | Age: 62
End: 2022-11-15

## 2022-11-15 ENCOUNTER — TREATMENT (OUTPATIENT)
Dept: PHYSICAL THERAPY | Facility: CLINIC | Age: 62
End: 2022-11-15

## 2022-11-15 DIAGNOSIS — Z47.89 ORTHOPEDIC AFTERCARE: ICD-10-CM

## 2022-11-15 DIAGNOSIS — M18.0 OSTEOARTHRITIS OF CARPOMETACARPAL (CMC) JOINT OF BOTH THUMBS: Primary | ICD-10-CM

## 2022-11-15 DIAGNOSIS — M18.11 ARTHRITIS OF CARPOMETACARPAL (CMC) JOINT OF RIGHT THUMB: Primary | ICD-10-CM

## 2022-11-15 PROCEDURE — L3808 WHFO, RIGID W/O JOINTS: HCPCS | Performed by: PHYSICAL THERAPIST

## 2022-11-15 NOTE — PROGRESS NOTES
Batool Medranoindia 1960   Diagnosis/ Surgery: right 1st CMCJ arthroplasty               Date Of Injury: chronic progressive    Date Of Surgery:11/1/22    Hand Dominance: right   History of Present Condition: thumb pain and dysfunction leading to arthroplasty. Had stitches removed today. Here for immobilization splint.   Medical/Vocational History/ Medications: caregiver to      Pain: 4/10    Edema: moderate at CMC J  Sensibility: WNL   Wound Status: scabbing, no sign of infection   ROM/ Strength: NT    Splinting:  · Patient was measure and fit with a custom fabricated forearm based thumb immobilization with DIPJ free.    · Patient was instructed in wearing schedule, precautions and care of the splint during this visit.   · Patient was instructed in proper donning/doffing of splint.   Assessment:  · Patient was fitted and appropriate splint was fabricated this date.  · Patient reported that splint was comfortable and had no complications with the fit of the splint.  · Patient was instructed and patient verbalized understanding of precautions, wear and care of the splint.   · Patient demonstrated independent donning/doffing of splint during treatment today.  Goals:  · Patient was fitted properly with appropriate splint for diagnosis  · Patient was educated on precautions, wear schedule and care of splint  · Patient demonstrated independence with donning/doffing of the splint.  · Splint was provided to Protect Healing Structures, Restrict Mobility, Improve joint alignment.  Plan:  · No additional treatment is required for this patient at this time. The patient is therefore discharged from therapy.  · Patient advised to contact therapist with any additional questions or concerns regarding the fit and function of the splint.  · Patient will be seen for splint issues as needed   · Wear Instructions: Off for hygiene           PT SIGNATURE: Corina Akers, PT   DATE TREATMENT INITIATED: 11/15/2022    Initial  Certification  Certification Period: 2/13/2023  I certify that the therapy services are furnished while this patient is under my care.  The services outlined above are required by this patient, and will be reviewed every 90 days.     PHYSICIAN: Lauryn Wiley MD      DATE:     Please sign and return via fax to [phone].. Thank you, [facility].

## 2022-11-21 NOTE — TELEPHONE ENCOUNTER
PA SEND    MERA PAZ (Key: [id number]) - [id number]  Nurtec 75MG dispersible tablets     This was a shared visit with the FEMI. I reviewed and verified the documentation and independently performed the documented:

## 2022-12-05 RX ORDER — BUPROPION HYDROCHLORIDE 150 MG/1
TABLET ORAL
Qty: 30 TABLET | Refills: 5 | Status: SHIPPED | OUTPATIENT
Start: 2022-12-05 | End: 2023-01-13

## 2022-12-05 NOTE — TELEPHONE ENCOUNTER
Rx Refill Note  Requested Prescriptions     Pending Prescriptions Disp Refills   • buPROPion XL (WELLBUTRIN XL) 150 MG 24 hr tablet [Pharmacy Med Name: bupropion HCl  mg 24 hr tablet, extended release] 30 tablet 5     Sig: TAKE 1 TABLET BY MOUTH ONCE DAILY      Last office visit with prescribing clinician: 6/10/2022   Last telemedicine visit with prescribing clinician: 12/12/2022   Next office visit with prescribing clinician: 12/12/2022                         Would you like a call back once the refill request has been completed: [] Yes [] No    If the office needs to give you a call back, can they leave a voicemail: [] Yes [] No    Citlali Willett MA  12/05/22, 09:21 EST

## 2022-12-28 RX ORDER — ACYCLOVIR 50 MG/G
OINTMENT TOPICAL
Qty: 15 G | Refills: 0 | Status: SHIPPED | OUTPATIENT
Start: 2022-12-28

## 2022-12-28 RX ORDER — BECLOMETHASONE DIPROPIONATE 80 UG/1
AEROSOL, METERED NASAL
Qty: 8.7 G | Refills: 11 | Status: SHIPPED | OUTPATIENT
Start: 2022-12-28

## 2022-12-28 RX ORDER — VALACYCLOVIR HYDROCHLORIDE 500 MG/1
500 TABLET, FILM COATED ORAL 2 TIMES DAILY
Qty: 10 TABLET | Refills: 2 | Status: SHIPPED | OUTPATIENT
Start: 2022-12-28

## 2023-01-12 RX ORDER — BUPROPION HYDROCHLORIDE 300 MG/1
TABLET ORAL
Qty: 30 TABLET | Refills: 5 | Status: SHIPPED | OUTPATIENT
Start: 2023-01-12

## 2023-01-12 NOTE — TELEPHONE ENCOUNTER
Rx Refill Note  Requested Prescriptions     Pending Prescriptions Disp Refills   • buPROPion XL (WELLBUTRIN XL) 300 MG 24 hr tablet [Pharmacy Med Name: bupropion HCl  mg 24 hr tablet, extended release] 30 tablet 5     Sig: TAKE 1 TABLET BY MOUTH ONCE DAILY      Last office visit with prescribing clinician: 6/10/2022   Last telemedicine visit with prescribing clinician: 1/30/2023   Next office visit with prescribing clinician: 1/30/2023                         Would you like a call back once the refill request has been completed: [] Yes [] No    If the office needs to give you a call back, can they leave a voicemail: [] Yes [] No    Citlali Willett MA  01/12/23, 10:47 EST

## 2023-01-13 RX ORDER — BUPROPION HYDROCHLORIDE 150 MG/1
TABLET ORAL
Qty: 30 TABLET | Refills: 5 | Status: SHIPPED | OUTPATIENT
Start: 2023-01-13

## 2023-01-13 NOTE — TELEPHONE ENCOUNTER
Rx Refill Note  Requested Prescriptions     Pending Prescriptions Disp Refills   • buPROPion XL (WELLBUTRIN XL) 150 MG 24 hr tablet [Pharmacy Med Name: bupropion HCl  mg 24 hr tablet, extended release] 30 tablet 5     Sig: TAKE 1 TABLET BY MOUTH ONCE DAILY      Last office visit with prescribing clinician: 6/10/2022   Last telemedicine visit with prescribing clinician: 1/30/2023   Next office visit with prescribing clinician: 1/30/2023                         Would you like a call back once the refill request has been completed: [] Yes [] No    If the office needs to give you a call back, can they leave a voicemail: [] Yes [] No    Kristin Marshall LPN  01/13/23, 09:31 EST

## 2023-02-02 ENCOUNTER — TELEPHONE (OUTPATIENT)
Dept: ORTHOPEDIC SURGERY | Facility: CLINIC | Age: 63
End: 2023-02-02

## 2023-02-02 NOTE — TELEPHONE ENCOUNTER
Caller: MERA PAZ    Relationship to patient: SELF    Best call back number: [phone]    Chief complaint: RIGHT HIP    Type of visit: INJECTION    Requested date: ASAP     If rescheduling, when is the original appointment: N/A     Additional notes: FLUOROSCOPIC GUIDED INJECTION; NOT AVAILABLE 3/4-3/12

## 2023-03-01 ENCOUNTER — OFFICE VISIT (OUTPATIENT)
Dept: INTERNAL MEDICINE | Facility: CLINIC | Age: 63
End: 2023-03-01
Payer: COMMERCIAL

## 2023-03-01 VITALS
TEMPERATURE: 98 F | WEIGHT: 154 LBS | HEIGHT: 66 IN | SYSTOLIC BLOOD PRESSURE: 114 MMHG | DIASTOLIC BLOOD PRESSURE: 86 MMHG | HEART RATE: 88 BPM | BODY MASS INDEX: 24.75 KG/M2

## 2023-03-01 DIAGNOSIS — Z11.59 ENCOUNTER FOR HEPATITIS C SCREENING TEST FOR LOW RISK PATIENT: ICD-10-CM

## 2023-03-01 DIAGNOSIS — I10 PRIMARY HYPERTENSION: Primary | ICD-10-CM

## 2023-03-01 DIAGNOSIS — R42 POSTURAL DIZZINESS WITH PRESYNCOPE: ICD-10-CM

## 2023-03-01 DIAGNOSIS — M79.7 FIBROMYALGIA: ICD-10-CM

## 2023-03-01 DIAGNOSIS — E78.2 MIXED HYPERLIPIDEMIA: ICD-10-CM

## 2023-03-01 DIAGNOSIS — R55 POSTURAL DIZZINESS WITH PRESYNCOPE: ICD-10-CM

## 2023-03-01 DIAGNOSIS — E55.9 VITAMIN D DEFICIENCY: ICD-10-CM

## 2023-03-01 DIAGNOSIS — F32.1 CURRENT MODERATE EPISODE OF MAJOR DEPRESSIVE DISORDER WITHOUT PRIOR EPISODE: ICD-10-CM

## 2023-03-01 PROCEDURE — 99214 OFFICE O/P EST MOD 30 MIN: CPT | Performed by: INTERNAL MEDICINE

## 2023-03-01 RX ORDER — DULOXETIN HYDROCHLORIDE 60 MG/1
60 CAPSULE, DELAYED RELEASE ORAL DAILY
Qty: 30 CAPSULE | Refills: 5 | Status: SHIPPED | OUTPATIENT
Start: 2023-03-01

## 2023-03-01 RX ORDER — DEXLANSOPRAZOLE 60 MG/1
1 CAPSULE, DELAYED RELEASE ORAL DAILY
Qty: 30 CAPSULE | Refills: 5 | Status: SHIPPED | OUTPATIENT
Start: 2023-03-01

## 2023-03-01 RX ORDER — TRIAMTERENE AND HYDROCHLOROTHIAZIDE 37.5; 25 MG/1; MG/1
1 CAPSULE ORAL DAILY
Qty: 30 CAPSULE | Refills: 5 | Status: SHIPPED | OUTPATIENT
Start: 2023-03-01

## 2023-03-01 RX ORDER — LISINOPRIL 10 MG/1
10 TABLET ORAL DAILY
Qty: 30 TABLET | Refills: 5 | Status: SHIPPED | OUTPATIENT
Start: 2023-03-01

## 2023-03-01 RX ORDER — LAMOTRIGINE 100 MG/1
100 TABLET ORAL DAILY
Qty: 30 TABLET | Refills: 5 | Status: SHIPPED | OUTPATIENT
Start: 2023-03-01

## 2023-03-01 NOTE — PROGRESS NOTES
"[facility]     Batool Hampton  1960   [id number]      Patient Care Team:  Don Rodriguez MD as PCP - General (Internal Medicine)  Mk Silver MD as Consulting Physician (Obstetrics and Gynecology)    Chief Complaint::   Chief Complaint   Patient presents with   • Med Refill     6 month follow up         HPI  The patient comes in for follow-up of hypertension, hyperlipidemia, depression, fibromyalgia, and vitamin D deficiency.    Depression  Her mood is okay as long as she takes her medication every day; if she misses a day or two then she feels wors. She needs the Lamictal.    Numbness  The patient states that she was experiencing numbness on a regular basis, but she had started again. She states that this time it involves her upper body. The more she thinks about it, it feels like it is something that happens very suddenly. She feels like her \"blood is draining,\" it lasts 30 seconds to a minute and then she feels really weak afterwards then she can move again. She can move while it is happening. She has a new grandchild. The other day she was walking and carrying the baby in her arms and said \"get the baby;\" or she would either lean back against a wall or sit down on the corner of a table. Sometimes she feels heaviness. She notes it started happening in her upper body in her sleep, it will wake her up, but she does not sleep. She has not felt palpitations or skipping. She states that when it does happen, it is not that she is paralyzed, she feels very weak. In between these episodes, she can go up and down steps and do physical work. She states it has gotten to the point where she is not getting out and doing anything. She states that she knows she is weak, but that is more of a lack of activity. She does not have these spells with dyspnea.    Migraines  She has tried several of the new migraine medicines with Dr. Horan and the one that seems to work best is " Nurtec.    Chronic Conditions:    Hypertension, hyperlipidemia, depression, fibromyalgia, and vitamin D deficiency.    Patient Active Problem List   Diagnosis   • Lumbar spondylosis   • Fibromyalgia   • Depression   • Allergic rhinitis   • Hyperlipidemia   • Primary hypertension   • Migraine   • Gastroesophageal reflux disease without esophagitis   • Vitamin D deficiency   • Current moderate episode of major depressive disorder without prior episode (HCC)        Past Medical History:   Diagnosis Date   • Acute cholecystitis 2016   • Cluster headache Early childhood   • CTS (carpal tunnel syndrome)    • Depression    • Deviated nasal septum    • Ectopic pregnancy    • Female infertility    • Fibromyalgia    • Fibromyalgia, primary    • Headache, tension-type     Not sure but if head hurts and it’s not a migraine I assume its tension   • HNP (herniated nucleus pulposus), lumbar 2008    L5Si   • Hyperlipidemia     Its getting higher but never been on Cholesterol meds   • Hypertension    • Migraine    • MRSA (methicillin resistant staph aureus) culture positive     Great toe L   • Osteoarthritis    • PONV (postoperative nausea and vomiting)    • PTSD (post-traumatic stress disorder)    • Shingles Uncertain    Had 1st vaccine and had severe reaction   • Urinary tract infection Occasionally   • Varicella Baby       Past Surgical History:   Procedure Laterality Date   •  SECTION  To remove growth due to fertility meds.    • KNEE SURGERY Bilateral    • LAPAROSCOPIC CHOLECYSTECTOMY     • NASAL SEPTUM SURGERY     • OOPHORECTOMY Right        Family History   Problem Relation Age of Onset   • Lung cancer Mother    • Cancer Father         Bladder   • Coronary artery disease Father 61        premature    • Pancreatic cancer Father    • Hypertension Father    • Migraines Father    • BRCA 1/2 Maternal Grandmother 40   • Breast cancer Maternal Grandmother    • Migraines Maternal  Grandmother    • BRCA 1/2 Cousin 23   • Migraines Paternal Aunt    • Seizures Brother          at 11 years old. Ill from birth   • Stroke Maternal Aunt         1st stroke approximately at 86 and lived to be 99.   • Ovarian cancer Neg Hx        Social History     Socioeconomic History   • Marital status:    Tobacco Use   • Smoking status: Never   • Smokeless tobacco: Never   Substance and Sexual Activity   • Alcohol use: Yes     Alcohol/week: 1.0 - 2.0 standard drink     Types: 1 - 2 Glasses of wine per week     Comment: Rarely   • Drug use: Never   • Sexual activity: Not Currently     Partners: Male     Birth control/protection: Post-menopausal       Allergies   Allergen Reactions   • Codeine Unknown (See Comments)     Unknown   • Morphine Unknown (See Comments)     Unknown   • Tetracyclines & Related Unknown (See Comments)     Unknown   • Sulfa Antibiotics Rash     And swelling with skin discoloration         Current Outpatient Medications:   •  acyclovir (Zovirax) 5 % ointment, Apply  topically to the appropriate area as directed Every 3 (Three) Hours., Disp: 15 g, Rfl: 0  •  Beclomethasone Dipropionate (Qnasl) 80 MCG/ACT aerosol solution, Use 1 spray in each nostril daily., Disp: 8.7 g, Rfl: 11  •  buPROPion XL (WELLBUTRIN XL) 150 MG 24 hr tablet, TAKE 1 TABLET BY MOUTH ONCE DAILY, Disp: 30 tablet, Rfl: 5  •  buPROPion XL (WELLBUTRIN XL) 300 MG 24 hr tablet, TAKE 1 TABLET BY MOUTH ONCE DAILY, Disp: 30 tablet, Rfl: 5  •  dexlansoprazole (Dexilant) 60 MG capsule, Take 1 capsule by mouth Daily., Disp: 30 capsule, Rfl: 5  •  diclofenac (VOLTAREN) 1 % gel gel, Apply 1 g topically to the appropriate area as directed Daily., Disp: , Rfl:   •  DULoxetine (CYMBALTA) 60 MG capsule, Take 1 capsule by mouth Daily., Disp: 30 capsule, Rfl: 5  •  estradiol (Vivelle-Dot) 0.05 MG/24HR patch, Place 1 patch on the skin as directed by provider 2 (Two) Times a Week for 364 days., Disp: 8 patch, Rfl: 12  •  hydroquinone 4 %  cream, MIX WITH MOISTURIZER AND APPLY TO FACE TWICE DAILY FOR 12 WEEKS, Disp: , Rfl:   •  imipramine (TOFRANIL) 10 MG tablet, Take 1-3 tablets by mouth Every Night., Disp: , Rfl:   •  lamoTRIgine (LaMICtal) 100 MG tablet, Take 1 tablet by mouth Daily., Disp: 30 tablet, Rfl: 5  •  levocetirizine (XYZAL) 5 MG tablet, Take 1 tablet by mouth Daily., Disp: , Rfl:   •  lisinopril (PRINIVIL,ZESTRIL) 10 MG tablet, Take 1 tablet by mouth Daily., Disp: 30 tablet, Rfl: 5  •  nystatin-triamcinolone (MYCOLOG) 815526-8.1 UNIT/GM-% ointment, Apply  topically to the appropriate area as directed 2 (Two) Times a Week., Disp: 60 g, Rfl: 1  •  ondansetron (ZOFRAN) 4 MG tablet, Take 1 tablet by mouth Every 8 (Eight) Hours As Needed., Disp: , Rfl:   •  ondansetron ODT (ZOFRAN-ODT) 8 MG disintegrating tablet, Place 1 tablet on the tongue Every 8 (Eight) Hours As Needed., Disp: 15 tablet, Rfl: 0  •  Progesterone (PROMETRIUM) 200 MG capsule, Take 1 capsule by mouth Daily., Disp: 30 capsule, Rfl: 12  •  Rimegepant Sulfate (Nurtec) 75 MG tablet dispersible tablet, Place 1 tablet under the tongue As Needed (At the onset of her headache.  Do not take over 1 tab a day or 8 tabs a month)., Disp: 8 tablet, Rfl: 3  •  tretinoin (RETIN-A) 0.05 % cream, APPLY TOPICALLY TO FACE EVERY NIGHT AT BEDTIME, Disp: , Rfl:   •  triamterene-hydrochlorothiazide (DYAZIDE) 37.5-25 MG per capsule, Take 1 capsule by mouth Daily., Disp: 30 capsule, Rfl: 5  •  valACYclovir (Valtrex) 500 MG tablet, Take 1 tablet by mouth 2 (Two) Times a Day., Disp: 10 tablet, Rfl: 2  •  zolpidem (AMBIEN) 5 MG tablet, TAKE 1-2 TABLETS BY MOUTH AT BEDTIME, Disp: 60 tablet, Rfl: 2    Review of Systems   Constitutional: Negative for chills, fatigue and fever.   HENT: Negative for congestion, ear pain and sinus pressure.    Respiratory: Negative for cough, chest tightness, shortness of breath and wheezing.    Cardiovascular: Negative for chest pain and palpitations.   Gastrointestinal:  "Negative for abdominal pain, blood in stool and constipation.   Skin: Negative for color change.   Allergic/Immunologic: Negative for environmental allergies.   Neurological: Negative for dizziness, speech difficulty and headache.   Psychiatric/Behavioral: Negative for decreased concentration. The patient is not nervous/anxious.         Vital Signs  Vitals:    03/01/23 0930   BP: 114/86   BP Location: Left arm   Patient Position: Sitting   Cuff Size: Adult   Pulse: 88   Temp: 98 °F (36.7 °C)   TempSrc: Temporal   Weight: 69.9 kg (154 lb)   Height: 167.6 cm (65.98\")   PainSc: 0-No pain       Physical Exam  Vitals reviewed.   Constitutional:       Appearance: She is well-developed.   HENT:      Head: Normocephalic and atraumatic.   Cardiovascular:      Rate and Rhythm: Normal rate and regular rhythm.      Heart sounds: Normal heart sounds. No murmur heard.  Pulmonary:      Effort: Pulmonary effort is normal.      Breath sounds: Normal breath sounds.   Neurological:      Mental Status: She is alert and oriented to person, place, and time.          Procedures    ACE III MINI             Assessment/Plan:    Diagnoses and all orders for this visit:    1. Primary hypertension (Primary)  -     Cancel: CBC & Differential; Future  -     CBC & Differential; Future    2. Mixed hyperlipidemia  -     Cancel: Apolipoprotein B; Future  -     Cancel: Comprehensive Metabolic Panel; Future  -     Cancel: Lipid Panel; Future  -     Apolipoprotein B; Future  -     Comprehensive Metabolic Panel; Future  -     Lipid Panel; Future    3. Current moderate episode of major depressive disorder without prior episode (HCC)    4. Fibromyalgia    5. Vitamin D deficiency  -     Cancel: Vitamin D,25-Hydroxy; Future  -     Vitamin D,25-Hydroxy; Future    6. Encounter for hepatitis C screening test for low risk patient  -     Cancel: Hepatitis C Antibody; Future    7. Postural dizziness with presyncope  -     Holter Monitor - 72 Hour Up To 15 Days; " Future    Other orders  -     lamoTRIgine (LaMICtal) 100 MG tablet; Take 1 tablet by mouth Daily.  Dispense: 30 tablet; Refill: 5  -     lisinopril (PRINIVIL,ZESTRIL) 10 MG tablet; Take 1 tablet by mouth Daily.  Dispense: 30 tablet; Refill: 5  -     DULoxetine (CYMBALTA) 60 MG capsule; Take 1 capsule by mouth Daily.  Dispense: 30 capsule; Refill: 5  -     dexlansoprazole (Dexilant) 60 MG capsule; Take 1 capsule by mouth Daily.  Dispense: 30 capsule; Refill: 5  -     triamterene-hydrochlorothiazide (DYAZIDE) 37.5-25 MG per capsule; Take 1 capsule by mouth Daily.  Dispense: 30 capsule; Refill: 5    1. Hypertension  - Blood pressure appears to be well controlled on lisinopril.    2. Presyncope  - It is not clear the etiology of symptoms, but I think the possibilities that are likely are low blood pressure or an arrhythmia. She is to monitor her blood pressure several times a week for the next several weeks and then forward results. We will also order a cardiac monitor to rule out arrhythmias.    3. Hyperlipidemia  - Lipid panel is pending. The treatment remains healthy diet.    4. Depression  - Mood appears to be well controlled on lamotrigine, duloxetine, and bupropion.    5. Fibromyalgia  - This is reasonably well controlled. She will continue regular physical activity and Cymbalta.    6. Vitamin D deficiency  - Vitamin D level is pending. She will continue supplemental vitamin D.    Follow up in 6 months.        Plan of care reviewed with patient at the conclusion of today's visit. Education was provided regarding diagnosis, management, and any prescribed or recommended OTC medications.Patient verbalizes understanding of and agreement with management plan.         Don Rodriguez MD         Transcribed from ambient dictation for Don Rodriguez MD by Bridget Roberts.  03/01/23   12:38 EST    Patient or patient representative verbalized consent to the visit recording.  I have personally performed the  services described in this document as transcribed by the above individual, and it is both accurate and complete.

## 2023-03-14 ENCOUNTER — TELEPHONE (OUTPATIENT)
Dept: NEUROLOGY | Facility: CLINIC | Age: 63
End: 2023-03-14
Payer: COMMERCIAL

## 2023-03-14 NOTE — TELEPHONE ENCOUNTER
Caller: Batool Hampton    Relationship to patient: Self    Best call back number: [phone]     Chief complaint: MIGRAINE     Type of visit: 6 MOS F/U    Requested date: ASAP     If rescheduling, when is the original appointment: 6/22/23 @ 2:45PM THEA CARRION    Additional notes:PATIENT HAD TO RESCHED. APPT THEA CARRION  ORIGINALLY SCHED. 3/22/23 @ 1:30PM BUT HAD TO RESCHED. TO 6/22/23 @ 2:45PM DUE TO A COURT DEPOSITION. SHE IS ASKING TO BE SEEN ASAP VIA TELEHEALTH RE: 4 DAY MIGRAINE SHE HAS HAD    PLEASE CALL PATIENT & ADVISE    THANK YOU

## 2023-03-15 NOTE — TELEPHONE ENCOUNTER
Ok I can offer an 11:00am or 3:30pm on 3/21/23 next week on Tuesday. Per Marline can be my chart or video visit.     Left patient a voicemail requesting return call and if one of these works and is still available when she calls back ok to put her on for Video visit with Marline

## 2023-03-23 ENCOUNTER — PRIOR AUTHORIZATION (OUTPATIENT)
Dept: NEUROLOGY | Facility: CLINIC | Age: 63
End: 2023-03-23
Payer: COMMERCIAL

## 2023-04-12 ENCOUNTER — OFFICE VISIT (OUTPATIENT)
Dept: ORTHOPEDIC SURGERY | Facility: CLINIC | Age: 63
End: 2023-04-12
Payer: COMMERCIAL

## 2023-04-12 VITALS — BODY MASS INDEX: 24.75 KG/M2 | HEIGHT: 66 IN | WEIGHT: 154 LBS

## 2023-04-12 DIAGNOSIS — M16.11 PRIMARY OSTEOARTHRITIS OF RIGHT HIP: Primary | ICD-10-CM

## 2023-04-12 PROCEDURE — 99213 OFFICE O/P EST LOW 20 MIN: CPT | Performed by: ORTHOPAEDIC SURGERY
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
## 2023-04-28 ENCOUNTER — OUTSIDE FACILITY SERVICE (OUTPATIENT)
Dept: ORTHOPEDIC SURGERY | Facility: CLINIC | Age: 63
End: 2023-04-28
Payer: COMMERCIAL

## 2023-04-28 ENCOUNTER — DOCUMENTATION (OUTPATIENT)
Dept: ORTHOPEDIC SURGERY | Facility: CLINIC | Age: 63
End: 2023-04-28

## 2023-04-28 NOTE — PROGRESS NOTES
[facility] [place] [facility]    Operative Report    [facility]  [address], [address]  [place] [address]    DATE OF PROCEDURE: 04/28/23    PREOPERATIVE DIAGNOSIS: right hip arthritis    POSTOPERATIVE DIAGNOSIS:  right hip arthritis    PROCEDURES PERFORMED:   right hip injection under fluoroscopic guidance    SURGEON: Ned Vasquez MD    SPECIMENS: None    ESTIMATED BLOOD LOSS: None    COMPLICATIONS: None    INDICATIONS: The patient is a 62 y.o. female with right hip pain secondary to osteoarthritis that failed to improve in spite of conservative treatment .  Options have been discussed at length with the patient, and patient has reached the point where the patient desires to proceed with an intra-articular hip injection understanding the risks, benefits, and alternatives. Consent was obtained. Please see my office notes for details with regard to preprocedure counseling and rationale.     DESCRIPTION OF PROCEDURE: The patient was positively identified in the preoperative holding area and brought to the operating suite and placed in a supine position.  Timeout procedure was performed to confirm the injection site, as well as other parameters. Following the sterile prep and drape, a 20-gauge spinal needle was placed into the hip joint, and confirmed to be in an intra-articular location with radiographic dye, and subsequently infiltrated with 40 mg of Kenalog mixed with ropivacaine.    The patient tolerated the procedure well and was brought to the recovery room in good condition.  The patient noted 100% improvement after walking from the operating room to the recovery room.    PLAN:  1.  The patient will keep a written or mental diary of how well the injection works and for how long.  2.  Follow up in 4 weeks as planned in the office.    Future Appointments   Date Time Provider Department Center   4/28/2023  9:50 AM Ned Vasquez MD [facility]    5/31/2023  2:50 PM Ned Vasquez MD [facility]   6/22/2023  2:45 PM Cora Horan MD [facility]   9/5/2023 10:15 AM Don Rodriguez MD [facility]       Ned Vasquez MD  04/28/23  08:31 EDT

## 2023-05-31 ENCOUNTER — OFFICE VISIT (OUTPATIENT)
Dept: OBSTETRICS AND GYNECOLOGY | Facility: CLINIC | Age: 63
End: 2023-05-31

## 2023-05-31 ENCOUNTER — OFFICE VISIT (OUTPATIENT)
Dept: ORTHOPEDIC SURGERY | Facility: CLINIC | Age: 63
End: 2023-05-31

## 2023-05-31 VITALS
WEIGHT: 151.4 LBS | DIASTOLIC BLOOD PRESSURE: 76 MMHG | HEIGHT: 66 IN | SYSTOLIC BLOOD PRESSURE: 110 MMHG | BODY MASS INDEX: 24.33 KG/M2

## 2023-05-31 VITALS
HEIGHT: 66 IN | BODY MASS INDEX: 24.98 KG/M2 | DIASTOLIC BLOOD PRESSURE: 78 MMHG | SYSTOLIC BLOOD PRESSURE: 108 MMHG | WEIGHT: 155.4 LBS

## 2023-05-31 DIAGNOSIS — Z01.419 WOMEN'S ANNUAL ROUTINE GYNECOLOGICAL EXAMINATION: Primary | ICD-10-CM

## 2023-05-31 DIAGNOSIS — R10.2 PELVIC PAIN: ICD-10-CM

## 2023-05-31 DIAGNOSIS — M16.11 PRIMARY OSTEOARTHRITIS OF RIGHT HIP: Primary | ICD-10-CM

## 2023-05-31 DIAGNOSIS — R11.0 NAUSEA WITHOUT VOMITING: ICD-10-CM

## 2023-05-31 PROCEDURE — 99213 OFFICE O/P EST LOW 20 MIN: CPT | Performed by: ORTHOPAEDIC SURGERY

## 2023-05-31 RX ORDER — ESTRADIOL 0.05 MG/D
1 PATCH, EXTENDED RELEASE TRANSDERMAL 2 TIMES WEEKLY
Qty: 8 PATCH | Refills: 12 | Status: SHIPPED | OUTPATIENT
Start: 2023-06-01 | End: 2024-05-30

## 2023-05-31 RX ORDER — PROMETHAZINE HYDROCHLORIDE 12.5 MG/1
12.5 TABLET ORAL EVERY 6 HOURS PRN
Qty: 30 TABLET | Refills: 3 | Status: SHIPPED | OUTPATIENT
Start: 2023-05-31

## 2023-05-31 RX ORDER — FREMANEZUMAB-VFRM 225 MG/1.5ML
INJECTION SUBCUTANEOUS
COMMUNITY
Start: 2023-05-26

## 2023-05-31 RX ORDER — PROGESTERONE 200 MG/1
200 CAPSULE ORAL DAILY
Qty: 30 CAPSULE | Refills: 12 | Status: SHIPPED | OUTPATIENT
Start: 2023-05-31 | End: 2024-06-24
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
## 2023-06-02 LAB — REF LAB TEST METHOD: NORMAL

## 2023-06-16 RX ORDER — AZITHROMYCIN 250 MG/1
TABLET, FILM COATED ORAL
Qty: 6 TABLET | Refills: 0 | Status: SHIPPED | OUTPATIENT
Start: 2023-06-16

## 2023-07-25 ENCOUNTER — TELEPHONE (OUTPATIENT)
Dept: INTERNAL MEDICINE | Facility: CLINIC | Age: 63
End: 2023-07-25
Payer: COMMERCIAL

## 2023-07-25 ENCOUNTER — PRIOR AUTHORIZATION (OUTPATIENT)
Dept: INTERNAL MEDICINE | Facility: CLINIC | Age: 63
End: 2023-07-25
Payer: COMMERCIAL

## 2023-07-25 NOTE — TELEPHONE ENCOUNTER
PA was denied. They asked is she had erosive or non erosive related GERD. I did not see any documentation in the chart. I had to initiate a new PA over the phone. Diagnoses K21.9 given. Case ID: [id number].

## 2023-07-25 NOTE — TELEPHONE ENCOUNTER
PA dexilant. Sent to Cover My Meds    Key:  [id number]    Case ID: [id number]    Tried Prevacid 30 mg and Prilosec 40 mg

## 2023-07-25 NOTE — TELEPHONE ENCOUNTER
Caller: [facility]    Relationship: Other    Best call back number: [phone]     What medications are you currently taking:   Current Outpatient Medications on File Prior to Visit   Medication Sig Dispense Refill    acyclovir (Zovirax) 5 % ointment Apply  topically to the appropriate area as directed Every 3 (Three) Hours. 15 g 0    Ajovy 225 MG/1.5ML solution auto-injector       azithromycin (Zithromax Z-Dominic) 250 MG tablet Take 2 tablets the first day, then 1 tablet daily for 4 days. 6 tablet 0    Beclomethasone Dipropionate (Qnasl) 80 MCG/ACT aerosol solution Use 1 spray in each nostril daily. 8.7 g 11    buPROPion XL (WELLBUTRIN XL) 150 MG 24 hr tablet TAKE 1 TABLET BY MOUTH ONCE DAILY 30 tablet 5    buPROPion XL (WELLBUTRIN XL) 300 MG 24 hr tablet TAKE 1 TABLET BY MOUTH ONCE DAILY 30 tablet 5    dexlansoprazole (DEXILANT) 60 MG capsule TAKE 1 CAPSULE BY MOUTH ONCE DAILY 30 capsule 5    diclofenac (VOLTAREN) 1 % gel gel Apply 1 g topically to the appropriate area as directed Daily.      DULoxetine (CYMBALTA) 60 MG capsule Take 1 capsule by mouth Daily. 30 capsule 5    estradiol (Vivelle-Dot) 0.05 MG/24HR patch Place 1 patch on the skin as directed by provider 2 (Two) Times a Week for 364 days. 8 patch 12    hydroquinone 4 % cream MIX WITH MOISTURIZER AND APPLY TO FACE TWICE DAILY FOR 12 WEEKS      imipramine (TOFRANIL) 10 MG tablet Take 1-3 tablets by mouth Every Night.      lamoTRIgine (LaMICtal) 100 MG tablet Take 1 tablet by mouth Daily. 30 tablet 5    levocetirizine (XYZAL) 5 MG tablet Take 1 tablet by mouth Daily.      lisinopril (PRINIVIL,ZESTRIL) 10 MG tablet Take 1 tablet by mouth Daily. 30 tablet 5    nystatin-triamcinolone (MYCOLOG) 515206-5.1 UNIT/GM-% ointment Apply  topically to the appropriate area as directed 2 (Two) Times a Week. 60 g 1    ondansetron (ZOFRAN) 4 MG tablet Take 1 tablet by mouth Every 8 (Eight) Hours As Needed.      ondansetron ODT (ZOFRAN-ODT) 8 MG  disintegrating tablet Place 1 tablet on the tongue Every 8 (Eight) Hours As Needed. 15 tablet 0    Progesterone (PROMETRIUM) 200 MG capsule Take 1 capsule by mouth Daily. 30 capsule 12    promethazine (PHENERGAN) 12.5 MG tablet Take 1 tablet by mouth Every 6 (Six) Hours As Needed for Nausea. 30 tablet 3    Rimegepant Sulfate (Nurtec) 75 MG tablet dispersible tablet Place 1 tablet under the tongue As Needed (At the onset of her headache.  Do not take over 1 tab a day or 8 tabs a month). 8 tablet 3    tretinoin (RETIN-A) 0.05 % cream APPLY TOPICALLY TO FACE EVERY NIGHT AT BEDTIME      triamterene-hydrochlorothiazide (DYAZIDE) 37.5-25 MG per capsule Take 1 capsule by mouth Daily. 30 capsule 5    valACYclovir (Valtrex) 500 MG tablet Take 1 tablet by mouth 2 (Two) Times a Day. 10 tablet 2    zolpidem (AMBIEN) 5 MG tablet TAKE 1-2 TABLETS BY MOUTH AT BEDTIME 60 tablet 2     No current facility-administered medications on file prior to visit.        When did you start taking these medications:     Which medication are you concerned about: dexlansoprazole (DEXILANT) 60 MG capsule    Who prescribed you this medication:     What are your concerns: [facility] IS CALLING AND WOULD LIKE TO CLARIFY THE PATIENTS DIAGNOSIS. THEY HAVE ALSO SENT A FAX AS WELL.     How long have you had these concerns:

## 2023-07-26 ENCOUNTER — LAB (OUTPATIENT)
Dept: LAB | Facility: HOSPITAL | Age: 63
End: 2023-07-26
Payer: COMMERCIAL

## 2023-07-26 ENCOUNTER — HOSPITAL ENCOUNTER (OUTPATIENT)
Dept: MAMMOGRAPHY | Facility: HOSPITAL | Age: 63
Discharge: HOME | End: 2023-07-26
Payer: COMMERCIAL

## 2023-07-26 DIAGNOSIS — E78.2 MIXED HYPERLIPIDEMIA: ICD-10-CM

## 2023-07-26 DIAGNOSIS — E55.9 VITAMIN D DEFICIENCY: ICD-10-CM

## 2023-07-26 DIAGNOSIS — I10 PRIMARY HYPERTENSION: ICD-10-CM

## 2023-07-26 DIAGNOSIS — R11.0 NAUSEA WITHOUT VOMITING: ICD-10-CM

## 2023-07-26 LAB
25(OH)D3 SERPL-MCNC: 33 NG/ML (ref 30–100)
ALBUMIN SERPL-MCNC: 4.4 G/DL (ref 3.5–5.2)
ALBUMIN/GLOB SERPL: 1.9 G/DL
ALP SERPL-CCNC: 94 U/L (ref 39–117)
ALT SERPL W P-5'-P-CCNC: 35 U/L (ref 1–33)
ANION GAP SERPL CALCULATED.3IONS-SCNC: 10 MMOL/L (ref 5–15)
AST SERPL-CCNC: 34 U/L (ref 1–32)
BASOPHILS # BLD AUTO: 0.09 10*3/MM3 (ref 0–0.2)
BASOPHILS NFR BLD AUTO: 1.4 % (ref 0–1.5)
BILIRUB SERPL-MCNC: 0.3 MG/DL (ref 0–1.2)
BUN SERPL-MCNC: 20 MG/DL (ref 8–23)
BUN/CREAT SERPL: 17.4 (ref 7–25)
CALCIUM SPEC-SCNC: 9.2 MG/DL (ref 8.6–10.5)
CHLORIDE SERPL-SCNC: 104 MMOL/L (ref 98–107)
CHOLEST SERPL-MCNC: 260 MG/DL (ref 0–200)
CO2 SERPL-SCNC: 26 MMOL/L (ref 22–29)
CREAT SERPL-MCNC: 1.15 MG/DL (ref 0.57–1)
DEPRECATED RDW RBC AUTO: 42.2 FL (ref 37–54)
EGFRCR SERPLBLD CKD-EPI 2021: 54 ML/MIN/1.73
EOSINOPHIL # BLD AUTO: 0.19 10*3/MM3 (ref 0–0.4)
EOSINOPHIL NFR BLD AUTO: 3 % (ref 0.3–6.2)
ERYTHROCYTE [DISTWIDTH] IN BLOOD BY AUTOMATED COUNT: 12.2 % (ref 12.3–15.4)
GLOBULIN UR ELPH-MCNC: 2.3 GM/DL
GLUCOSE SERPL-MCNC: 89 MG/DL (ref 65–99)
HCT VFR BLD AUTO: 41.5 % (ref 34–46.6)
HDLC SERPL-MCNC: 71 MG/DL (ref 40–60)
HGB BLD-MCNC: 13.9 G/DL (ref 12–15.9)
IMM GRANULOCYTES # BLD AUTO: 0.01 10*3/MM3 (ref 0–0.05)
IMM GRANULOCYTES NFR BLD AUTO: 0.2 % (ref 0–0.5)
LDLC SERPL CALC-MCNC: 175 MG/DL (ref 0–100)
LDLC/HDLC SERPL: 2.43 {RATIO}
LYMPHOCYTES # BLD AUTO: 2.13 10*3/MM3 (ref 0.7–3.1)
LYMPHOCYTES NFR BLD AUTO: 34.2 % (ref 19.6–45.3)
MCH RBC QN AUTO: 31.2 PG (ref 26.6–33)
MCHC RBC AUTO-ENTMCNC: 33.5 G/DL (ref 31.5–35.7)
MCV RBC AUTO: 93.3 FL (ref 79–97)
MONOCYTES # BLD AUTO: 0.58 10*3/MM3 (ref 0.1–0.9)
MONOCYTES NFR BLD AUTO: 9.3 % (ref 5–12)
NEUTROPHILS NFR BLD AUTO: 3.23 10*3/MM3 (ref 1.7–7)
NEUTROPHILS NFR BLD AUTO: 51.9 % (ref 42.7–76)
NRBC BLD AUTO-RTO: 0 /100 WBC (ref 0–0.2)
PLATELET # BLD AUTO: 275 10*3/MM3 (ref 140–450)
PMV BLD AUTO: 10.1 FL (ref 6–12)
POTASSIUM SERPL-SCNC: 4 MMOL/L (ref 3.5–5.2)
PROT SERPL-MCNC: 6.7 G/DL (ref 6–8.5)
RBC # BLD AUTO: 4.45 10*6/MM3 (ref 3.77–5.28)
SODIUM SERPL-SCNC: 140 MMOL/L (ref 136–145)
TRIGL SERPL-MCNC: 84 MG/DL (ref 0–150)
VLDLC SERPL-MCNC: 14 MG/DL (ref 5–40)
WBC NRBC COR # BLD: 6.23 10*3/MM3 (ref 3.4–10.8)

## 2023-07-26 PROCEDURE — 82306 VITAMIN D 25 HYDROXY: CPT

## 2023-07-26 PROCEDURE — 80053 COMPREHEN METABOLIC PANEL: CPT

## 2023-07-26 PROCEDURE — 77067 SCR MAMMO BI INCL CAD: CPT

## 2023-07-26 PROCEDURE — 82172 ASSAY OF APOLIPOPROTEIN: CPT

## 2023-07-26 PROCEDURE — 80061 LIPID PANEL: CPT

## 2023-07-26 PROCEDURE — 77063 BREAST TOMOSYNTHESIS BI: CPT

## 2023-07-26 PROCEDURE — 85025 COMPLETE CBC W/AUTO DIFF WBC: CPT

## 2023-07-28 LAB — APO B SERPL-MCNC: 127 MG/DL

## 2023-07-31 ENCOUNTER — TELEPHONE (OUTPATIENT)
Dept: INTERNAL MEDICINE | Facility: CLINIC | Age: 63
End: 2023-07-31
Payer: COMMERCIAL

## 2023-07-31 DIAGNOSIS — R79.89 ABNORMAL BLOOD CREATININE LEVEL: Primary | ICD-10-CM

## 2023-07-31 DIAGNOSIS — R79.89 ABNORMAL LIVER FUNCTION TESTS: ICD-10-CM

## 2023-08-02 ENCOUNTER — OFFICE VISIT (OUTPATIENT)
Dept: ORTHOPEDIC SURGERY | Facility: CLINIC | Age: 63
End: 2023-08-02
Payer: COMMERCIAL

## 2023-08-02 VITALS
DIASTOLIC BLOOD PRESSURE: 80 MMHG | SYSTOLIC BLOOD PRESSURE: 136 MMHG | BODY MASS INDEX: 24.46 KG/M2 | HEIGHT: 66 IN | WEIGHT: 152.2 LBS

## 2023-08-02 DIAGNOSIS — M16.11 PRIMARY OSTEOARTHRITIS OF RIGHT HIP: Primary | ICD-10-CM

## 2023-08-02 PROCEDURE — 99214 OFFICE O/P EST MOD 30 MIN: CPT | Performed by: ORTHOPAEDIC SURGERY

## 2023-08-02 RX ORDER — UBROGEPANT 100 MG/1
100 TABLET ORAL AS NEEDED
COMMUNITY
Start: 2023-07-26

## 2023-09-05 ENCOUNTER — LAB (OUTPATIENT)
Dept: LAB | Facility: HOSPITAL | Age: 63
End: 2023-09-05
Payer: COMMERCIAL

## 2023-09-05 ENCOUNTER — OFFICE VISIT (OUTPATIENT)
Dept: INTERNAL MEDICINE | Facility: CLINIC | Age: 63
End: 2023-09-05
Payer: COMMERCIAL

## 2023-09-05 VITALS
HEIGHT: 65 IN | DIASTOLIC BLOOD PRESSURE: 74 MMHG | SYSTOLIC BLOOD PRESSURE: 110 MMHG | HEART RATE: 80 BPM | WEIGHT: 156.2 LBS | TEMPERATURE: 97.7 F | BODY MASS INDEX: 26.02 KG/M2

## 2023-09-05 DIAGNOSIS — I10 PRIMARY HYPERTENSION: ICD-10-CM

## 2023-09-05 DIAGNOSIS — F32.1 CURRENT MODERATE EPISODE OF MAJOR DEPRESSIVE DISORDER WITHOUT PRIOR EPISODE: ICD-10-CM

## 2023-09-05 DIAGNOSIS — E78.2 MIXED HYPERLIPIDEMIA: ICD-10-CM

## 2023-09-05 DIAGNOSIS — R79.89 ABNORMAL LIVER FUNCTION TESTS: ICD-10-CM

## 2023-09-05 DIAGNOSIS — M79.7 FIBROMYALGIA: ICD-10-CM

## 2023-09-05 DIAGNOSIS — K21.9 GASTROESOPHAGEAL REFLUX DISEASE WITHOUT ESOPHAGITIS: ICD-10-CM

## 2023-09-05 DIAGNOSIS — M47.816 LUMBAR SPONDYLOSIS: ICD-10-CM

## 2023-09-05 DIAGNOSIS — E55.9 VITAMIN D DEFICIENCY: ICD-10-CM

## 2023-09-05 DIAGNOSIS — G43.009 MIGRAINE WITHOUT AURA AND WITHOUT STATUS MIGRAINOSUS, NOT INTRACTABLE: ICD-10-CM

## 2023-09-05 DIAGNOSIS — Z00.00 PREVENTATIVE HEALTH CARE: Primary | ICD-10-CM

## 2023-09-05 DIAGNOSIS — R79.89 ABNORMAL BLOOD CREATININE LEVEL: ICD-10-CM

## 2023-09-05 LAB
ALBUMIN SERPL-MCNC: 4.3 G/DL (ref 3.5–5.2)
ALBUMIN/GLOB SERPL: 1.7 G/DL
ALP SERPL-CCNC: 80 U/L (ref 39–117)
ALT SERPL W P-5'-P-CCNC: 33 U/L (ref 1–33)
ANION GAP SERPL CALCULATED.3IONS-SCNC: 14 MMOL/L (ref 5–15)
AST SERPL-CCNC: 31 U/L (ref 1–32)
BILIRUB SERPL-MCNC: 0.4 MG/DL (ref 0–1.2)
BUN SERPL-MCNC: 17 MG/DL (ref 8–23)
BUN/CREAT SERPL: 15 (ref 7–25)
CALCIUM SPEC-SCNC: 9.7 MG/DL (ref 8.6–10.5)
CHLORIDE SERPL-SCNC: 101 MMOL/L (ref 98–107)
CO2 SERPL-SCNC: 26 MMOL/L (ref 22–29)
CREAT SERPL-MCNC: 1.13 MG/DL (ref 0.57–1)
EGFRCR SERPLBLD CKD-EPI 2021: 55.1 ML/MIN/1.73
GLOBULIN UR ELPH-MCNC: 2.5 GM/DL
GLUCOSE SERPL-MCNC: 86 MG/DL (ref 65–99)
POTASSIUM SERPL-SCNC: 4 MMOL/L (ref 3.5–5.2)
PROT SERPL-MCNC: 6.8 G/DL (ref 6–8.5)
SODIUM SERPL-SCNC: 141 MMOL/L (ref 136–145)

## 2023-09-05 PROCEDURE — 99396 PREV VISIT EST AGE 40-64: CPT | Performed by: INTERNAL MEDICINE

## 2023-09-05 PROCEDURE — 80053 COMPREHEN METABOLIC PANEL: CPT

## 2023-09-05 RX ORDER — ONDANSETRON 4 MG/1
4 TABLET, FILM COATED ORAL EVERY 8 HOURS PRN
Qty: 30 TABLET | Refills: 5 | Status: SHIPPED | OUTPATIENT
Start: 2023-09-05

## 2023-09-05 RX ORDER — BUPROPION HYDROCHLORIDE 300 MG/1
300 TABLET ORAL DAILY
Qty: 30 TABLET | Refills: 5 | Status: SHIPPED | OUTPATIENT
Start: 2023-09-05

## 2023-09-05 RX ORDER — DEXLANSOPRAZOLE 60 MG/1
1 CAPSULE, DELAYED RELEASE ORAL DAILY
Qty: 30 CAPSULE | Refills: 5 | Status: SHIPPED | OUTPATIENT
Start: 2023-09-05

## 2023-09-05 RX ORDER — BUPROPION HYDROCHLORIDE 150 MG/1
150 TABLET ORAL DAILY
Qty: 30 TABLET | Refills: 5 | Status: SHIPPED | OUTPATIENT
Start: 2023-09-05

## 2023-09-05 RX ORDER — ONDANSETRON 8 MG/1
8 TABLET, ORALLY DISINTEGRATING ORAL EVERY 8 HOURS PRN
Qty: 15 TABLET | Refills: 0 | Status: CANCELLED | OUTPATIENT
Start: 2023-09-05

## 2023-09-05 RX ORDER — ROSUVASTATIN CALCIUM 10 MG/1
10 TABLET, COATED ORAL DAILY
Qty: 90 TABLET | Refills: 3 | Status: SHIPPED | OUTPATIENT
Start: 2023-09-05
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
## 2023-09-08 RX ORDER — LAMOTRIGINE 100 MG/1
TABLET ORAL
Qty: 30 TABLET | Refills: 5 | Status: SHIPPED | OUTPATIENT
Start: 2023-09-08

## 2023-09-18 RX ORDER — TRIAMTERENE AND HYDROCHLOROTHIAZIDE 37.5; 25 MG/1; MG/1
CAPSULE ORAL
Qty: 30 CAPSULE | Refills: 5 | Status: SHIPPED | OUTPATIENT
Start: 2023-09-18

## 2023-10-17 ENCOUNTER — TRANSCRIBE ORDERS (OUTPATIENT)
Dept: ADMINISTRATIVE | Facility: HOSPITAL | Age: 63
End: 2023-10-17
Payer: COMMERCIAL

## 2023-10-17 DIAGNOSIS — M50.20 OTHER CERVICAL DISC DISPLACEMENT, UNSPECIFIED CERVICAL REGION: Primary | ICD-10-CM

## 2023-10-17 DIAGNOSIS — G44.52 NEW DAILY PERSISTENT HEADACHE: ICD-10-CM

## 2023-11-27 RX ORDER — LISINOPRIL 10 MG/1
10 TABLET ORAL DAILY
Qty: 30 TABLET | Refills: 5 | Status: SHIPPED | OUTPATIENT
Start: 2023-11-27

## 2023-12-27 RX ORDER — VALACYCLOVIR HYDROCHLORIDE 500 MG/1
500 TABLET, FILM COATED ORAL 2 TIMES DAILY
Qty: 10 TABLET | Refills: 2 | Status: SHIPPED | OUTPATIENT
Start: 2023-12-27

## 2023-12-27 RX ORDER — PROMETHAZINE HYDROCHLORIDE 12.5 MG/1
12.5 TABLET ORAL EVERY 6 HOURS PRN
Qty: 30 TABLET | Refills: 3 | OUTPATIENT
Start: 2023-12-27

## 2023-12-27 RX ORDER — DULOXETIN HYDROCHLORIDE 60 MG/1
60 CAPSULE, DELAYED RELEASE ORAL DAILY
Qty: 30 CAPSULE | Refills: 5 | Status: SHIPPED | OUTPATIENT
Start: 2023-12-27

## 2024-01-25 RX ORDER — DEXLANSOPRAZOLE 60 MG/1
1 CAPSULE, DELAYED RELEASE ORAL DAILY
Qty: 90 CAPSULE | Refills: 1 | Status: SHIPPED | OUTPATIENT
Start: 2024-01-25

## 2024-03-05 ENCOUNTER — OFFICE VISIT (OUTPATIENT)
Dept: INTERNAL MEDICINE | Facility: CLINIC | Age: 64
End: 2024-03-05
Payer: COMMERCIAL

## 2024-03-05 VITALS
TEMPERATURE: 98.6 F | OXYGEN SATURATION: 96 % | BODY MASS INDEX: 25.92 KG/M2 | WEIGHT: 155.6 LBS | HEIGHT: 65 IN | SYSTOLIC BLOOD PRESSURE: 132 MMHG | DIASTOLIC BLOOD PRESSURE: 84 MMHG | HEART RATE: 88 BPM

## 2024-03-05 DIAGNOSIS — I10 PRIMARY HYPERTENSION: Primary | ICD-10-CM

## 2024-03-05 DIAGNOSIS — G47.09 OTHER INSOMNIA: ICD-10-CM

## 2024-03-05 DIAGNOSIS — F32.1 CURRENT MODERATE EPISODE OF MAJOR DEPRESSIVE DISORDER WITHOUT PRIOR EPISODE: ICD-10-CM

## 2024-03-05 DIAGNOSIS — M79.7 FIBROMYALGIA: ICD-10-CM

## 2024-03-05 DIAGNOSIS — G43.009 MIGRAINE WITHOUT AURA AND WITHOUT STATUS MIGRAINOSUS, NOT INTRACTABLE: ICD-10-CM

## 2024-03-05 DIAGNOSIS — E55.9 VITAMIN D DEFICIENCY: ICD-10-CM

## 2024-03-05 DIAGNOSIS — R53.83 OTHER FATIGUE: ICD-10-CM

## 2024-03-05 DIAGNOSIS — E78.2 MIXED HYPERLIPIDEMIA: ICD-10-CM

## 2024-03-05 PROCEDURE — 99214 OFFICE O/P EST MOD 30 MIN: CPT | Performed by: INTERNAL MEDICINE

## 2024-03-05 RX ORDER — ZOLPIDEM TARTRATE 5 MG/1
TABLET ORAL
Qty: 60 TABLET | Refills: 2 | Status: SHIPPED | OUTPATIENT
Start: 2024-03-05

## 2024-03-05 RX ORDER — DULOXETIN HYDROCHLORIDE 30 MG/1
30 CAPSULE, DELAYED RELEASE ORAL DAILY
Qty: 30 CAPSULE | Refills: 5 | Status: SHIPPED | OUTPATIENT
Start: 2024-03-05
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
## 2024-03-06 ENCOUNTER — OFFICE VISIT (OUTPATIENT)
Dept: OBSTETRICS AND GYNECOLOGY | Facility: CLINIC | Age: 64
End: 2024-03-06
Payer: COMMERCIAL

## 2024-03-06 VITALS
DIASTOLIC BLOOD PRESSURE: 68 MMHG | BODY MASS INDEX: 26.09 KG/M2 | SYSTOLIC BLOOD PRESSURE: 108 MMHG | WEIGHT: 156.6 LBS | HEIGHT: 65 IN

## 2024-03-06 DIAGNOSIS — Z12.31 SCREENING MAMMOGRAM FOR BREAST CANCER: Primary | ICD-10-CM

## 2024-03-06 DIAGNOSIS — Z78.0 MENOPAUSE: ICD-10-CM

## 2024-03-06 DIAGNOSIS — L72.0 EPIDERMAL INCLUSION CYST: ICD-10-CM

## 2024-03-06 DIAGNOSIS — N90.89 VULVAR LESION: ICD-10-CM

## 2024-03-06 RX ORDER — NYSTATIN AND TRIAMCINOLONE ACETONIDE 100000; 1 [USP'U]/G; MG/G
1 OINTMENT TOPICAL 2 TIMES DAILY
Qty: 60 G | Refills: 3 | Status: SHIPPED | OUTPATIENT
Start: 2024-03-06

## 2024-03-06 NOTE — PROGRESS NOTES
Chief Complaint   Patient presents with    Follow-up     Labial cyst       Subjective   HPI  Batool Hampton is a 63 y.o. female, , who presents for labial cyst. Patient states that she first noticed the cyst around 5 months ago. She states that cyst has recently became more painful and irritating. She reports that she has never had a labial cyst prior to this occurrence.   \  Her last LMP was No LMP recorded. Patient is postmenopausal..  Periods are  absent due to menopause   Patient reports problems with: none.  Partner Status: Marital Status: .  New Partners since last visit: no.  Desires STD Screening: no.    Additional OB/GYN History   Current contraception: contraceptive methods: Post menopausal status  Desires to: do not start contraception  Last Pap :   Last Completed Pap Smear            PAP SMEAR (Every 3 Years) Next due on 2023  LIQUID-BASED PAP SMEAR WITH HPV GENOTYPING IF ASCUS ([facility])    2022  LIQUID-BASED PAP SMEAR, [facility] ([facility])    2021  SCANNED - PAP SMEAR                  History of abnormal Pap smear: no  Last mammogram:   Last Completed Mammogram            Ordered - MAMMOGRAM (Every 2 Years) Ordered on 3/6/2024      2023  Mammo Screening Digital Tomosynthesis Bilateral With CAD    06/10/2022  Mammo Screening Digital Tomosynthesis Bilateral With CAD    2021  Mammo Screening Digital Tomosynthesis Bilateral With CAD    2019  Mammo Screening Digital Tomosynthesis Bilateral With CAD    10/08/2018  Mammo screening digital tomosynthesis bilateral w CAD    Only the first 5 history entries have been loaded, but more history exists.                  Tobacco Usage?: No   OB History          1    Para   0    Term   0       0    AB   1    Living   0         SAB        IAB        Ectopic        Molar        Multiple        Live Births                    Health Maintenance   Topic Date Due    ZOSTER VACCINE  "(2 of 3) 01/30/2019    HEPATITIS C SCREENING  Never done    RSV Vaccine - Adults (1 - 1-dose 60+ series) Never done    COLORECTAL CANCER SCREENING  07/10/2022    DXA SCAN  05/26/2023    COVID-19 Vaccine (5 - 2023-24 season) 09/01/2023    Annual Gynecologic Pelvic and Breast Exam  06/01/2024    LIPID PANEL  07/26/2024    ANNUAL PHYSICAL  09/05/2024    BMI FOLLOWUP  09/05/2024    TDAP/TD VACCINES (2 - Td or Tdap) 11/25/2024    MAMMOGRAM  07/26/2025    PAP SMEAR  05/31/2026    INFLUENZA VACCINE  Completed    Pneumococcal Vaccine 0-64  Aged Out       The additional following portions of the patient's history were reviewed and updated as appropriate: allergies, current medications, past family history, past medical history, past social history, past surgical history, and problem list.    Review of Systems   Constitutional: Negative.    HENT: Negative.     Eyes: Negative.    Respiratory: Negative.     Cardiovascular: Negative.    Gastrointestinal: Negative.    Endocrine: Negative.    Genitourinary: Negative.         Labial cyst   Musculoskeletal: Negative.    Skin: Negative.    Allergic/Immunologic: Negative.    Neurological: Negative.    Hematological: Negative.    Psychiatric/Behavioral: Negative.         I have reviewed and agree with the HPI, ROS, and historical information as entered above. Dusty Arnold MD    Objective   /68   Ht 165 cm (64.96\")   Wt 71 kg (156 lb 9.6 oz)   BMI 26.09 kg/m²     Physical Exam  Vitals reviewed. Exam conducted with a chaperone present.   Constitutional:       Appearance: Normal appearance. She is normal weight.   Genitourinary:     General: Normal vulva.          Comments: Small single epidermal inclusion cyst present  Skin:     General: Skin is warm and dry.   Neurological:      Mental Status: She is alert and oriented to person, place, and time.   Psychiatric:         Mood and Affect: Mood normal.         Behavior: Behavior normal.         Assessment & Plan "     Assessment     Problem List Items Addressed This Visit       Epidermal inclusion cyst     Other Visit Diagnoses       Screening mammogram for breast cancer    -  Primary    Relevant Orders    Mammo Screening Digital Tomosynthesis Bilateral With CAD    Menopause        Relevant Orders    DEXA Bone Density Axial    Vulvar lesion                Epidermal inclusion cyst  Vulvar lesion  Menopause    Plan     Return in about 4 months (around 7/2/2024) for Annual physical. DEXA ordered for that day.  Rx sent for steroid/nystatin ointment with instructions.   Continue HRT.       Dusty Arnold MD  03/06/2024

## 2024-03-14 RX ORDER — TRIAMTERENE AND HYDROCHLOROTHIAZIDE 37.5; 25 MG/1; MG/1
CAPSULE ORAL
Qty: 30 CAPSULE | Refills: 5 | Status: SHIPPED | OUTPATIENT
Start: 2024-03-14

## 2024-03-14 RX ORDER — LAMOTRIGINE 100 MG/1
TABLET ORAL
Qty: 30 TABLET | Refills: 5 | Status: SHIPPED | OUTPATIENT
Start: 2024-03-14

## 2024-04-11 RX ORDER — BUPROPION HYDROCHLORIDE 150 MG/1
150 TABLET ORAL DAILY
Qty: 30 TABLET | Refills: 5 | Status: SHIPPED | OUTPATIENT
Start: 2024-04-11

## 2024-04-11 RX ORDER — BUPROPION HYDROCHLORIDE 300 MG/1
300 TABLET ORAL DAILY
Qty: 30 TABLET | Refills: 5 | Status: SHIPPED | OUTPATIENT
Start: 2024-04-11

## 2024-04-11 NOTE — TELEPHONE ENCOUNTER
Rx Refill Note  Requested Prescriptions     Pending Prescriptions Disp Refills    buPROPion XL (WELLBUTRIN XL) 300 MG 24 hr tablet [Pharmacy Med Name: bupropion HCl  mg 24 hr tablet, extended release] 30 tablet 5     Sig: TAKE 1 TABLET BY MOUTH ONCE DAILY    buPROPion XL (WELLBUTRIN XL) 150 MG 24 hr tablet [Pharmacy Med Name: bupropion HCl  mg 24 hr tablet, extended release] 30 tablet 5     Sig: TAKE 1 TABLET BY MOUTH ONCE DAILY      Last office visit with prescribing clinician: 3/5/2024   Last telemedicine visit with prescribing clinician: Visit date not found   Next office visit with prescribing clinician: 9/6/2024                         Would you like a call back once the refill request has been completed: [] Yes [] No    If the office needs to give you a call back, can they leave a voicemail: [] Yes [] No    Era Aleman MA  04/11/24, 09:01 EDT

## 2024-05-20 DIAGNOSIS — G47.09 OTHER INSOMNIA: ICD-10-CM

## 2024-05-20 RX ORDER — ZOLPIDEM TARTRATE 5 MG/1
TABLET ORAL
Qty: 60 TABLET | Refills: 2 | Status: SHIPPED | OUTPATIENT
Start: 2024-05-20

## 2024-05-20 NOTE — TELEPHONE ENCOUNTER
Rx Refill Note  Requested Prescriptions     Pending Prescriptions Disp Refills    zolpidem (AMBIEN) 5 MG tablet [Pharmacy Med Name: zolpidem 5 mg tablet] 60 tablet 2     Sig: TAKE 2 TABLETS BY MOUTH EVERY NIGHT AT BEDTIME      Last refill:  3/5/24 #60/2  Last office visit with prescribing clinician: 3/5/2024   Last telemedicine visit with prescribing clinician: Visit date not found   Next office visit with prescribing clinician: 9/6/2024                         Would you like a call back once the refill request has been completed: [] Yes [] No    If the office needs to give you a call back, can they leave a voicemail: [] Yes [] No    Marion Juarez RN  05/20/24, 11:53 EDT

## 2024-05-22 RX ORDER — LISINOPRIL 10 MG/1
10 TABLET ORAL DAILY
Qty: 30 TABLET | Refills: 5 | Status: SHIPPED | OUTPATIENT
Start: 2024-05-22

## 2024-06-03 RX ORDER — VALACYCLOVIR HYDROCHLORIDE 500 MG/1
500 TABLET, FILM COATED ORAL 2 TIMES DAILY
Qty: 10 TABLET | Refills: 2 | Status: SHIPPED | OUTPATIENT
Start: 2024-06-03

## 2024-06-03 RX ORDER — ONDANSETRON 4 MG/1
4 TABLET, FILM COATED ORAL EVERY 8 HOURS PRN
Qty: 30 TABLET | Refills: 5 | Status: SHIPPED | OUTPATIENT
Start: 2024-06-03

## 2024-06-11 DIAGNOSIS — Z01.419 WOMEN'S ANNUAL ROUTINE GYNECOLOGICAL EXAMINATION: ICD-10-CM

## 2024-06-11 RX ORDER — ESTRADIOL 0.05 MG/D
PATCH, EXTENDED RELEASE TRANSDERMAL
Qty: 8 PATCH | Refills: 1 | Status: SHIPPED | OUTPATIENT
Start: 2024-06-11

## 2024-06-11 RX ORDER — DULOXETIN HYDROCHLORIDE 60 MG/1
60 CAPSULE, DELAYED RELEASE ORAL DAILY
Qty: 30 CAPSULE | Refills: 5 | Status: SHIPPED | OUTPATIENT
Start: 2024-06-11

## 2024-06-11 NOTE — TELEPHONE ENCOUNTER
Rx Refill Note  Requested Prescriptions     Pending Prescriptions Disp Refills    DULoxetine (CYMBALTA) 60 MG capsule [Pharmacy Med Name: duloxetine 60 mg capsule,delayed release] 30 capsule 5     Sig: TAKE 1 CAPSULE BY MOUTH ONCE DAILY      Last office visit with prescribing clinician: 3/5/2024   Next office visit with prescribing clinician: 9/6/2024                         Would you like a call back once the refill request has been completed: [] Yes [] No    If the office needs to give you a call back, can they leave a voicemail: [] Yes [] No    Kristel Wgigins LPN  06/11/24, 09:28 EDT

## 2024-09-26 ENCOUNTER — LAB (OUTPATIENT)
Dept: LAB | Facility: HOSPITAL | Age: 64
End: 2024-09-26
Payer: COMMERCIAL

## 2024-09-26 ENCOUNTER — OFFICE VISIT (OUTPATIENT)
Dept: INTERNAL MEDICINE | Facility: CLINIC | Age: 64
End: 2024-09-26
Payer: COMMERCIAL

## 2024-09-26 VITALS
HEIGHT: 65 IN | OXYGEN SATURATION: 96 % | HEART RATE: 89 BPM | WEIGHT: 158.8 LBS | SYSTOLIC BLOOD PRESSURE: 128 MMHG | DIASTOLIC BLOOD PRESSURE: 88 MMHG | TEMPERATURE: 98 F | BODY MASS INDEX: 26.46 KG/M2

## 2024-09-26 DIAGNOSIS — Z00.00 PREVENTATIVE HEALTH CARE: Primary | ICD-10-CM

## 2024-09-26 DIAGNOSIS — E55.9 VITAMIN D DEFICIENCY: ICD-10-CM

## 2024-09-26 DIAGNOSIS — I10 PRIMARY HYPERTENSION: ICD-10-CM

## 2024-09-26 DIAGNOSIS — E78.2 MIXED HYPERLIPIDEMIA: ICD-10-CM

## 2024-09-26 DIAGNOSIS — F32.1 CURRENT MODERATE EPISODE OF MAJOR DEPRESSIVE DISORDER WITHOUT PRIOR EPISODE: ICD-10-CM

## 2024-09-26 DIAGNOSIS — K21.9 GASTROESOPHAGEAL REFLUX DISEASE WITHOUT ESOPHAGITIS: ICD-10-CM

## 2024-09-26 DIAGNOSIS — M79.7 FIBROMYALGIA: ICD-10-CM

## 2024-09-26 LAB
25(OH)D3 SERPL-MCNC: 24.9 NG/ML (ref 30–100)
ALBUMIN SERPL-MCNC: 4.6 G/DL (ref 3.5–5.2)
ALBUMIN/GLOB SERPL: 1.7 G/DL
ALP SERPL-CCNC: 90 U/L (ref 39–117)
ALT SERPL W P-5'-P-CCNC: 25 U/L (ref 1–33)
ANION GAP SERPL CALCULATED.3IONS-SCNC: 9.7 MMOL/L (ref 5–15)
AST SERPL-CCNC: 30 U/L (ref 1–32)
BASOPHILS # BLD AUTO: 0.07 10*3/MM3 (ref 0–0.2)
BASOPHILS NFR BLD AUTO: 1.1 % (ref 0–1.5)
BILIRUB SERPL-MCNC: 0.6 MG/DL (ref 0–1.2)
BUN SERPL-MCNC: 15 MG/DL (ref 8–23)
BUN/CREAT SERPL: 12.2 (ref 7–25)
CALCIUM SPEC-SCNC: 10 MG/DL (ref 8.6–10.5)
CHLORIDE SERPL-SCNC: 103 MMOL/L (ref 98–107)
CHOLEST SERPL-MCNC: 183 MG/DL (ref 0–200)
CO2 SERPL-SCNC: 25.3 MMOL/L (ref 22–29)
CREAT SERPL-MCNC: 1.23 MG/DL (ref 0.57–1)
DEPRECATED RDW RBC AUTO: 41.1 FL (ref 37–54)
EGFRCR SERPLBLD CKD-EPI 2021: 49.5 ML/MIN/1.73
EOSINOPHIL # BLD AUTO: 0.19 10*3/MM3 (ref 0–0.4)
EOSINOPHIL NFR BLD AUTO: 2.9 % (ref 0.3–6.2)
ERYTHROCYTE [DISTWIDTH] IN BLOOD BY AUTOMATED COUNT: 12.1 % (ref 12.3–15.4)
GLOBULIN UR ELPH-MCNC: 2.7 GM/DL
GLUCOSE SERPL-MCNC: 98 MG/DL (ref 65–99)
HCT VFR BLD AUTO: 43 % (ref 34–46.6)
HDLC SERPL-MCNC: 67 MG/DL (ref 40–60)
HGB BLD-MCNC: 14.5 G/DL (ref 12–15.9)
IMM GRANULOCYTES # BLD AUTO: 0.02 10*3/MM3 (ref 0–0.05)
IMM GRANULOCYTES NFR BLD AUTO: 0.3 % (ref 0–0.5)
LDLC SERPL CALC-MCNC: 100 MG/DL (ref 0–100)
LDLC/HDLC SERPL: 1.46 {RATIO}
LYMPHOCYTES # BLD AUTO: 1.52 10*3/MM3 (ref 0.7–3.1)
LYMPHOCYTES NFR BLD AUTO: 23 % (ref 19.6–45.3)
MCH RBC QN AUTO: 31.4 PG (ref 26.6–33)
MCHC RBC AUTO-ENTMCNC: 33.7 G/DL (ref 31.5–35.7)
MCV RBC AUTO: 93.1 FL (ref 79–97)
MONOCYTES # BLD AUTO: 0.65 10*3/MM3 (ref 0.1–0.9)
MONOCYTES NFR BLD AUTO: 9.8 % (ref 5–12)
NEUTROPHILS NFR BLD AUTO: 4.15 10*3/MM3 (ref 1.7–7)
NEUTROPHILS NFR BLD AUTO: 62.9 % (ref 42.7–76)
NRBC BLD AUTO-RTO: 0 /100 WBC (ref 0–0.2)
PLATELET # BLD AUTO: 267 10*3/MM3 (ref 140–450)
PMV BLD AUTO: 9.9 FL (ref 6–12)
POTASSIUM SERPL-SCNC: 3.8 MMOL/L (ref 3.5–5.2)
PROT SERPL-MCNC: 7.3 G/DL (ref 6–8.5)
RBC # BLD AUTO: 4.62 10*6/MM3 (ref 3.77–5.28)
SODIUM SERPL-SCNC: 138 MMOL/L (ref 136–145)
TRIGL SERPL-MCNC: 90 MG/DL (ref 0–150)
VLDLC SERPL-MCNC: 16 MG/DL (ref 5–40)
WBC NRBC COR # BLD AUTO: 6.6 10*3/MM3 (ref 3.4–10.8)

## 2024-09-26 PROCEDURE — 85025 COMPLETE CBC W/AUTO DIFF WBC: CPT

## 2024-09-26 PROCEDURE — 82172 ASSAY OF APOLIPOPROTEIN: CPT

## 2024-09-26 PROCEDURE — 82306 VITAMIN D 25 HYDROXY: CPT

## 2024-09-26 PROCEDURE — 99396 PREV VISIT EST AGE 40-64: CPT | Performed by: INTERNAL MEDICINE

## 2024-09-26 PROCEDURE — 80061 LIPID PANEL: CPT

## 2024-09-26 PROCEDURE — 80053 COMPREHEN METABOLIC PANEL: CPT

## 2024-09-26 RX ORDER — BUPROPION HYDROCHLORIDE 150 MG/1
150 TABLET ORAL DAILY
Qty: 90 TABLET | Refills: 1 | Status: SHIPPED | OUTPATIENT
Start: 2024-09-26

## 2024-09-26 RX ORDER — DEXLANSOPRAZOLE 60 MG/1
1 CAPSULE, DELAYED RELEASE ORAL DAILY
Qty: 90 CAPSULE | Refills: 1 | Status: SHIPPED | OUTPATIENT
Start: 2024-09-26

## 2024-09-26 RX ORDER — LAMOTRIGINE 100 MG/1
100 TABLET ORAL DAILY
Qty: 90 TABLET | Refills: 1 | Status: SHIPPED | OUTPATIENT
Start: 2024-09-26

## 2024-09-26 RX ORDER — DULOXETIN HYDROCHLORIDE 30 MG/1
30 CAPSULE, DELAYED RELEASE ORAL DAILY
Qty: 90 CAPSULE | Refills: 1 | Status: SHIPPED | OUTPATIENT
Start: 2024-09-26

## 2024-09-26 RX ORDER — LISINOPRIL 10 MG/1
10 TABLET ORAL DAILY
Qty: 90 TABLET | Refills: 1 | Status: SHIPPED | OUTPATIENT
Start: 2024-09-26

## 2024-09-26 RX ORDER — TRIAMTERENE AND HYDROCHLOROTHIAZIDE 37.5; 25 MG/1; MG/1
1 CAPSULE ORAL DAILY
Qty: 90 CAPSULE | Refills: 1 | Status: SHIPPED | OUTPATIENT
Start: 2024-09-26

## 2024-09-26 RX ORDER — BUPROPION HYDROCHLORIDE 300 MG/1
300 TABLET ORAL DAILY
Qty: 90 TABLET | Refills: 1 | Status: SHIPPED | OUTPATIENT
Start: 2024-09-26

## 2024-09-26 RX ORDER — TRAZODONE HYDROCHLORIDE 50 MG/1
50 TABLET, FILM COATED ORAL NIGHTLY
Qty: 90 TABLET | Refills: 1 | Status: SHIPPED | OUTPATIENT
Start: 2024-09-26

## 2024-09-26 RX ORDER — DULOXETIN HYDROCHLORIDE 60 MG/1
60 CAPSULE, DELAYED RELEASE ORAL DAILY
Qty: 90 CAPSULE | Refills: 1 | Status: SHIPPED | OUTPATIENT
Start: 2024-09-26

## 2024-09-26 RX ORDER — ROSUVASTATIN CALCIUM 10 MG/1
10 TABLET, COATED ORAL DAILY
Qty: 90 TABLET | Refills: 3 | Status: SHIPPED | OUTPATIENT
Start: 2024-09-26

## 2024-09-29 LAB — APO B SERPL-MCNC: 83 MG/DL

## 2024-10-07 ENCOUNTER — PATIENT MESSAGE (OUTPATIENT)
Dept: INTERNAL MEDICINE | Facility: CLINIC | Age: 64
End: 2024-10-07
Payer: COMMERCIAL

## 2024-10-07 RX ORDER — LAMOTRIGINE 100 MG/1
100 TABLET ORAL DAILY
Qty: 90 TABLET | Refills: 1 | Status: SHIPPED | OUTPATIENT
Start: 2024-10-07

## 2024-10-07 RX ORDER — LISINOPRIL 10 MG/1
10 TABLET ORAL DAILY
Qty: 90 TABLET | Refills: 1 | Status: SHIPPED | OUTPATIENT
Start: 2024-10-07

## 2024-10-07 RX ORDER — BUPROPION HYDROCHLORIDE 300 MG/1
300 TABLET ORAL DAILY
Qty: 90 TABLET | Refills: 1 | Status: SHIPPED | OUTPATIENT
Start: 2024-10-07

## 2024-10-07 RX ORDER — ROSUVASTATIN CALCIUM 10 MG/1
10 TABLET, COATED ORAL DAILY
Qty: 90 TABLET | Refills: 3 | Status: SHIPPED | OUTPATIENT
Start: 2024-10-07

## 2024-10-07 RX ORDER — DULOXETIN HYDROCHLORIDE 60 MG/1
60 CAPSULE, DELAYED RELEASE ORAL DAILY
Qty: 90 CAPSULE | Refills: 1 | Status: SHIPPED | OUTPATIENT
Start: 2024-10-07

## 2024-10-07 RX ORDER — DEXLANSOPRAZOLE 60 MG/1
1 CAPSULE, DELAYED RELEASE ORAL DAILY
Qty: 90 CAPSULE | Refills: 1 | Status: SHIPPED | OUTPATIENT
Start: 2024-10-07

## 2024-10-07 RX ORDER — TRAZODONE HYDROCHLORIDE 50 MG/1
50 TABLET, FILM COATED ORAL NIGHTLY
Qty: 90 TABLET | Refills: 1 | Status: SHIPPED | OUTPATIENT
Start: 2024-10-07

## 2024-10-07 RX ORDER — DEXLANSOPRAZOLE 60 MG/1
1 CAPSULE, DELAYED RELEASE ORAL DAILY
Qty: 90 CAPSULE | Refills: 1 | OUTPATIENT
Start: 2024-10-07

## 2024-10-07 RX ORDER — TRIAMTERENE AND HYDROCHLOROTHIAZIDE 37.5; 25 MG/1; MG/1
1 CAPSULE ORAL DAILY
Qty: 90 CAPSULE | Refills: 1 | Status: SHIPPED | OUTPATIENT
Start: 2024-10-07

## 2024-10-07 RX ORDER — DULOXETIN HYDROCHLORIDE 30 MG/1
30 CAPSULE, DELAYED RELEASE ORAL DAILY
Qty: 90 CAPSULE | Refills: 1 | Status: SHIPPED | OUTPATIENT
Start: 2024-10-07

## 2024-10-07 RX ORDER — BUPROPION HYDROCHLORIDE 150 MG/1
150 TABLET ORAL DAILY
Qty: 90 TABLET | Refills: 1 | Status: SHIPPED | OUTPATIENT
Start: 2024-10-07

## 2024-10-07 RX ORDER — LEVOCETIRIZINE DIHYDROCHLORIDE 5 MG/1
5 TABLET, FILM COATED ORAL DAILY
Qty: 90 TABLET | Refills: 1 | Status: SHIPPED | OUTPATIENT
Start: 2024-10-07

## 2024-10-07 NOTE — TELEPHONE ENCOUNTER
From: Batool Hampton  To: Don Rodriguez  Sent: 10/7/2024 12:08 PM EDT  Subject: 90 Day Prescriptions    Hello, My pharmacy may have reached out to your office requesting prescriptions. They asked me to reach out to you also. They will not except the written prescriptions that you gave me. The order must come from your office. Please include a 90 day prescription for Xyzal with the other nine prescriptions as it is cheaper through them. There information is: [facility], [address] [place] [address] [place] eSCRIBE - [phone]; Phone: [phone]; Fax: [phone]. They said you should be able to go ahead and eScribe those to them. Thank you & glad you were pleased with my test results.

## 2024-10-15 ENCOUNTER — OFFICE VISIT (OUTPATIENT)
Dept: OBSTETRICS AND GYNECOLOGY | Facility: CLINIC | Age: 64
End: 2024-10-15
Payer: COMMERCIAL

## 2024-10-15 ENCOUNTER — HOSPITAL ENCOUNTER (OUTPATIENT)
Facility: HOSPITAL | Age: 64
Discharge: HOME | End: 2024-10-15
Admitting: OBSTETRICS & GYNECOLOGY
Payer: COMMERCIAL

## 2024-10-15 VITALS
DIASTOLIC BLOOD PRESSURE: 80 MMHG | SYSTOLIC BLOOD PRESSURE: 106 MMHG | WEIGHT: 162.2 LBS | HEIGHT: 65 IN | BODY MASS INDEX: 27.02 KG/M2

## 2024-10-15 DIAGNOSIS — Z01.419 WOMEN'S ANNUAL ROUTINE GYNECOLOGICAL EXAMINATION: ICD-10-CM

## 2024-10-15 DIAGNOSIS — Z01.419 ENCOUNTER FOR ANNUAL ROUTINE GYNECOLOGICAL EXAMINATION: Primary | ICD-10-CM

## 2024-10-15 DIAGNOSIS — Z79.890 HORMONE REPLACEMENT THERAPY (HRT): ICD-10-CM

## 2024-10-15 DIAGNOSIS — Z12.31 SCREENING MAMMOGRAM FOR BREAST CANCER: ICD-10-CM

## 2024-10-15 PROCEDURE — 77063 BREAST TOMOSYNTHESIS BI: CPT

## 2024-10-15 PROCEDURE — 77067 SCR MAMMO BI INCL CAD: CPT

## 2024-10-15 RX ORDER — ERGOCALCIFEROL 1.25 MG/1
50000 CAPSULE ORAL WEEKLY
Qty: 4 CAPSULE | Refills: 2 | Status: SHIPPED | OUTPATIENT
Start: 2024-10-15 | End: 2025-01-13

## 2024-10-15 RX ORDER — NYSTATIN AND TRIAMCINOLONE ACETONIDE 100000; 1 [USP'U]/G; MG/G
1 OINTMENT TOPICAL 2 TIMES DAILY
Qty: 30 G | Refills: 3 | Status: SHIPPED | OUTPATIENT
Start: 2024-10-15

## 2024-10-15 RX ORDER — ESTRADIOL 0.05 MG/D
1 PATCH, EXTENDED RELEASE TRANSDERMAL 2 TIMES WEEKLY
Qty: 24 PATCH | Refills: 3 | Status: SHIPPED | OUTPATIENT
Start: 2024-10-17 | End: 2025-01-15

## 2024-10-15 RX ORDER — NORETHINDRONE ACETATE 5 MG
5 TABLET ORAL DAILY
Qty: 90 TABLET | Refills: 3 | Status: SHIPPED | OUTPATIENT
Start: 2024-10-15 | End: 2025-01-13
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
## 2024-12-17 RX ORDER — DEXLANSOPRAZOLE 60 MG/1
1 CAPSULE, DELAYED RELEASE ORAL DAILY
Qty: 90 CAPSULE | Refills: 1 | Status: SHIPPED | OUTPATIENT
Start: 2024-12-17

## 2024-12-17 NOTE — TELEPHONE ENCOUNTER
Caller: Mera Hampton    Relationship: Self    Best call back number: [phone]     Requested Prescriptions:   Requested Prescriptions     Pending Prescriptions Disp Refills    dexlansoprazole (DEXILANT) 60 MG capsule 90 capsule 1     Sig: Take 1 capsule by mouth Daily.        Pharmacy where request should be sent: [id number] - [address] - [address] AT [facility] & [facility] - [phone] [facility] [phone]      Last office visit with prescribing clinician: 9/26/2024   Last telemedicine visit with prescribing clinician: Visit date not found   Next office visit with prescribing clinician: 3/27/2025     Additional details provided by patient: [facility] HAS BEEN OUT OF THIS FOR 2 DAYS.    Does the patient have less than a 3 day supply:  [x] Yes  [] No    Would you like a call back once the refill request has been completed: [] Yes [] No    If the office needs to give you a call back, can they leave a voicemail: [] Yes [] No    Shine Sandoval Rep   12/17/24 15:17 EST

## 2024-12-17 NOTE — TELEPHONE ENCOUNTER
Rx Refill Note  Requested Prescriptions     Pending Prescriptions Disp Refills    dexlansoprazole (DEXILANT) 60 MG capsule 90 capsule 1     Sig: Take 1 capsule by mouth Daily.      Last office visit with prescribing clinician: 9/26/2024   Last telemedicine visit with prescribing clinician: Visit date not found   Next office visit with prescribing clinician: 3/27/2025                         Would you like a call back once the refill request has been completed: [] Yes [] No    If the office needs to give you a call back, can they leave a voicemail: [] Yes [] No    Oralia Bolton MA  12/17/24, 15:19 EST

## 2025-01-27 DIAGNOSIS — Z01.419 WOMEN'S ANNUAL ROUTINE GYNECOLOGICAL EXAMINATION: ICD-10-CM

## 2025-01-27 NOTE — TELEPHONE ENCOUNTER
Caller: [place], [place] - [address] - [phone] [facility] [phone] FX    Relationship: Pharmacy    Best call back number: [phone]     Requested Prescriptions:   Requested Prescriptions     Pending Prescriptions Disp Refills    triamterene-hydrochlorothiazide (DYAZIDE) 37.5-25 MG per capsule 90 capsule 1     Sig: Take 1 capsule by mouth Daily.    estradiol (MINIVELLE, VIVELLE-DOT) 0.05 MG/24HR patch 24 patch 3     Sig: Place 1 patch on the skin as directed by provider 2 (Two) Times a Week for 90 days.        Pharmacy where request should be sent: [place], [place] - [address] - [phone]  - [phone] FX     Last office visit with prescribing clinician: 9/26/2024   Last telemedicine visit with prescribing clinician: Visit date not found   Next office visit with prescribing clinician: 3/27/2025       Does the patient have less than a 3 day supply:  [x] Yes  [] No    Would you like a call back once the refill request has been completed: [] Yes [x] No    If the office needs to give you a call back, can they leave a voicemail: [] Yes [x] No    Shine Rodriguez Rep   01/27/25 16:00 EST              What Type Of Note Output Would You Prefer (Optional)?: Standard Output How Severe Is Your Skin Lesion?: moderate Has Your Skin Lesion Been Treated?: not been treated Is This A New Presentation, Or A Follow-Up?: Skin Lesion Additional History: The patient states that she cannot think of anything that may have caused this discoloration. 25.8

## 2025-01-28 RX ORDER — TRIAMTERENE AND HYDROCHLOROTHIAZIDE 37.5; 25 MG/1; MG/1
1 CAPSULE ORAL DAILY
Qty: 90 CAPSULE | Refills: 1 | Status: SHIPPED | OUTPATIENT
Start: 2025-01-28

## 2025-01-28 RX ORDER — ESTRADIOL 0.05 MG/D
1 PATCH, EXTENDED RELEASE TRANSDERMAL 2 TIMES WEEKLY
Qty: 24 PATCH | Refills: 3 | Status: SHIPPED | OUTPATIENT
Start: 2025-01-30 | End: 2026-01-01

## 2025-04-11 ENCOUNTER — OFFICE VISIT (OUTPATIENT)
Dept: INTERNAL MEDICINE | Facility: CLINIC | Age: 65
End: 2025-04-11
Payer: COMMERCIAL

## 2025-04-11 VITALS
BODY MASS INDEX: 26.89 KG/M2 | TEMPERATURE: 98.2 F | DIASTOLIC BLOOD PRESSURE: 74 MMHG | OXYGEN SATURATION: 95 % | WEIGHT: 161.4 LBS | HEIGHT: 65 IN | SYSTOLIC BLOOD PRESSURE: 122 MMHG | HEART RATE: 87 BPM

## 2025-04-11 DIAGNOSIS — M79.7 FIBROMYALGIA: ICD-10-CM

## 2025-04-11 DIAGNOSIS — F32.1 CURRENT MODERATE EPISODE OF MAJOR DEPRESSIVE DISORDER WITHOUT PRIOR EPISODE: ICD-10-CM

## 2025-04-11 DIAGNOSIS — E78.2 MIXED HYPERLIPIDEMIA: ICD-10-CM

## 2025-04-11 DIAGNOSIS — I10 PRIMARY HYPERTENSION: Primary | ICD-10-CM

## 2025-04-11 PROCEDURE — 99214 OFFICE O/P EST MOD 30 MIN: CPT | Performed by: INTERNAL MEDICINE
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
## 2025-04-16 RX ORDER — TRAZODONE HYDROCHLORIDE 50 MG/1
50 TABLET ORAL NIGHTLY
Qty: 90 TABLET | Refills: 1 | Status: SHIPPED | OUTPATIENT
Start: 2025-04-16

## 2025-04-16 RX ORDER — LAMOTRIGINE 100 MG/1
100 TABLET ORAL DAILY
Qty: 90 TABLET | Refills: 1 | Status: SHIPPED | OUTPATIENT
Start: 2025-04-16

## 2025-04-16 RX ORDER — BUPROPION HYDROCHLORIDE 150 MG/1
150 TABLET ORAL DAILY
Qty: 90 TABLET | Refills: 1 | Status: SHIPPED | OUTPATIENT
Start: 2025-04-16

## 2025-04-16 RX ORDER — DULOXETIN HYDROCHLORIDE 30 MG/1
30 CAPSULE, DELAYED RELEASE ORAL DAILY
Qty: 90 CAPSULE | Refills: 1 | Status: SHIPPED | OUTPATIENT
Start: 2025-04-16

## 2025-04-16 RX ORDER — LEVOCETIRIZINE DIHYDROCHLORIDE 5 MG/1
5 TABLET, FILM COATED ORAL DAILY
Qty: 90 TABLET | Refills: 1 | Status: SHIPPED | OUTPATIENT
Start: 2025-04-16

## 2025-04-16 RX ORDER — BUPROPION HYDROCHLORIDE 300 MG/1
300 TABLET ORAL DAILY
Qty: 90 TABLET | Refills: 1 | Status: SHIPPED | OUTPATIENT
Start: 2025-04-16

## 2025-04-16 RX ORDER — DULOXETIN HYDROCHLORIDE 60 MG/1
60 CAPSULE, DELAYED RELEASE ORAL DAILY
Qty: 90 CAPSULE | Refills: 1 | Status: SHIPPED | OUTPATIENT
Start: 2025-04-16

## 2025-04-16 NOTE — TELEPHONE ENCOUNTER
Caller: [facility] [address] - [phone] [facility] [phone] FX    Relationship: Pharmacy    Best call back number: [phone]    Requested Prescriptions:   Requested Prescriptions     Pending Prescriptions Disp Refills    buPROPion XL (WELLBUTRIN XL) 300 MG 24 hr tablet 90 tablet 1     Sig: Take 1 tablet by mouth Daily.    DULoxetine (CYMBALTA) 30 MG capsule 90 capsule 1     Sig: Take 1 capsule by mouth Daily.    DULoxetine (CYMBALTA) 60 MG capsule 90 capsule 1     Sig: Take 1 capsule by mouth Daily.    buPROPion XL (WELLBUTRIN XL) 150 MG 24 hr tablet 90 tablet 1     Sig: Take 1 tablet by mouth Daily.    levocetirizine (XYZAL) 5 MG tablet 90 tablet 1     Sig: Take 1 tablet by mouth Daily.    traZODone (DESYREL) 50 MG tablet 90 tablet 1     Sig: Take 1 tablet by mouth Every Night.    lamoTRIgine (LaMICtal) 100 MG tablet 90 tablet 1     Sig: Take 1 tablet by mouth Daily.        Pharmacy where request should be sent: [address] - [phone] [facility] [phone] FX     Last office visit with prescribing clinician: 4/11/2025   Last telemedicine visit with prescribing clinician: Visit date not found   Next office visit with prescribing clinician: 10/22/2025     Additional details provided by patient: PATIENT IS OUT OF A COUPLE MEDICATIONS    Does the patient have less than a 3 day supply:  [x] Yes  [] No    Would you like a call back once the refill request has been completed: [] Yes [x] No    If the office needs to give you a call back, can they leave a voicemail: [] Yes [x] No    Shine Villa Rep   04/16/25 15:07 EDT

## 2025-04-16 NOTE — TELEPHONE ENCOUNTER
Rx Refill Note  Requested Prescriptions     Pending Prescriptions Disp Refills    buPROPion XL (WELLBUTRIN XL) 300 MG 24 hr tablet 90 tablet 1     Sig: Take 1 tablet by mouth Daily.    DULoxetine (CYMBALTA) 30 MG capsule 90 capsule 1     Sig: Take 1 capsule by mouth Daily.    DULoxetine (CYMBALTA) 60 MG capsule 90 capsule 1     Sig: Take 1 capsule by mouth Daily.    buPROPion XL (WELLBUTRIN XL) 150 MG 24 hr tablet 90 tablet 1     Sig: Take 1 tablet by mouth Daily.    levocetirizine (XYZAL) 5 MG tablet 90 tablet 1     Sig: Take 1 tablet by mouth Daily.    traZODone (DESYREL) 50 MG tablet 90 tablet 1     Sig: Take 1 tablet by mouth Every Night.    lamoTRIgine (LaMICtal) 100 MG tablet 90 tablet 1     Sig: Take 1 tablet by mouth Daily.      Last office visit with prescribing clinician: 4/11/2025   Last telemedicine visit with prescribing clinician: Visit date not found   Next office visit with prescribing clinician: 10/22/2025                         Would you like a call back once the refill request has been completed: [] Yes [] No    If the office needs to give you a call back, can they leave a voicemail: [] Yes [] No    Oralia Bolton MA  04/16/25, 15:09 EDT

## 2025-05-20 ENCOUNTER — TELEPHONE (OUTPATIENT)
Dept: INTERNAL MEDICINE | Facility: CLINIC | Age: 65
End: 2025-05-20
Payer: COMMERCIAL

## 2025-05-20 RX ORDER — LISINOPRIL 10 MG/1
10 TABLET ORAL DAILY
Qty: 90 TABLET | Refills: 1 | Status: SHIPPED | OUTPATIENT
Start: 2025-05-20

## 2025-06-24 RX ORDER — DEXLANSOPRAZOLE 60 MG/1
1 CAPSULE, DELAYED RELEASE ORAL DAILY
Qty: 90 CAPSULE | Refills: 1 | Status: SHIPPED | OUTPATIENT
Start: 2025-06-24

## 2025-07-15 RX ORDER — DEXLANSOPRAZOLE 60 MG/1
1 CAPSULE, DELAYED RELEASE ORAL DAILY
Qty: 90 CAPSULE | Refills: 1 | Status: SHIPPED | OUTPATIENT
Start: 2025-07-15

## 2025-07-22 RX ORDER — TRIAMTERENE AND HYDROCHLOROTHIAZIDE 37.5; 25 MG/1; MG/1
1 CAPSULE ORAL DAILY
Qty: 90 CAPSULE | Refills: 1 | Status: SHIPPED | OUTPATIENT
Start: 2025-07-22

## 2025-07-22 NOTE — TELEPHONE ENCOUNTER
Caller: [facility] [address] - [phone] [place] [phone] FX    Relationship: Pharmacy    Best call back number:   Telephone Information:   Mobile [phone]       Requested Prescriptions:   Requested Prescriptions     Pending Prescriptions Disp Refills    triamterene-hydrochlorothiazide (DYAZIDE) 37.5-25 MG per capsule 90 capsule 1     Sig: Take 1 capsule by mouth Daily.        Pharmacy where request should be sent: [address] - [phone] [facility] [phone] FX     Last office visit with prescribing clinician: 4/11/2025   Last telemedicine visit with prescribing clinician: Visit date not found   Next office visit with prescribing clinician: 10/22/2025       Does the patient have less than a 3 day supply:  [] Yes  [x] No      Shine Chaudhari Rep   07/22/25 12:40 EDT

## 2025-08-28 ENCOUNTER — HOSPITAL ENCOUNTER (OUTPATIENT)
Dept: GENERAL RADIOLOGY | Facility: HOSPITAL | Age: 65
Discharge: HOME | End: 2025-08-28
Payer: COMMERCIAL

## 2025-08-28 ENCOUNTER — OFFICE VISIT (OUTPATIENT)
Dept: INTERNAL MEDICINE | Facility: CLINIC | Age: 65
End: 2025-08-28
Payer: COMMERCIAL

## 2025-08-28 VITALS
HEIGHT: 65 IN | SYSTOLIC BLOOD PRESSURE: 108 MMHG | WEIGHT: 155 LBS | HEART RATE: 96 BPM | TEMPERATURE: 98.7 F | BODY MASS INDEX: 25.83 KG/M2 | DIASTOLIC BLOOD PRESSURE: 72 MMHG | OXYGEN SATURATION: 92 %

## 2025-08-28 DIAGNOSIS — R07.81 RIB PAIN ON LEFT SIDE: ICD-10-CM

## 2025-08-28 DIAGNOSIS — R09.89 ABNORMAL LUNG SOUNDS: Primary | ICD-10-CM

## 2025-08-28 DIAGNOSIS — R05.2 SUBACUTE COUGH: ICD-10-CM

## 2025-08-28 DIAGNOSIS — R09.89 ABNORMAL LUNG SOUNDS: ICD-10-CM

## 2025-08-28 PROCEDURE — 71046 X-RAY EXAM CHEST 2 VIEWS: CPT

## 2025-08-28 PROCEDURE — 99214 OFFICE O/P EST MOD 30 MIN: CPT

## 2025-08-28 RX ORDER — METHYLPREDNISOLONE 4 MG/1
TABLET ORAL
Qty: 21 TABLET | Refills: 0 | Status: SHIPPED | OUTPATIENT
Start: 2025-08-28

## 2025-08-28 RX ORDER — BECLOMETHASONE DIPROPIONATE 80 UG/1
1 AEROSOL, METERED NASAL DAILY
Qty: 10.6 G | Refills: 1 | Status: SHIPPED | OUTPATIENT
Start: 2025-08-28

## 2025-08-28 RX ORDER — AZITHROMYCIN 250 MG/1
TABLET, FILM COATED ORAL
Qty: 6 TABLET | Refills: 0 | Status: SHIPPED | OUTPATIENT
Start: 2025-08-28